# Patient Record
Sex: MALE | Race: WHITE | Employment: OTHER | URBAN - METROPOLITAN AREA
[De-identification: names, ages, dates, MRNs, and addresses within clinical notes are randomized per-mention and may not be internally consistent; named-entity substitution may affect disease eponyms.]

---

## 2018-01-13 PROBLEM — N40.0 BPH (BENIGN PROSTATIC HYPERPLASIA): Status: ACTIVE | Noted: 2018-01-13

## 2018-01-16 PROBLEM — N21.0 BLADDER STONES: Status: ACTIVE | Noted: 2018-01-16

## 2018-08-10 ENCOUNTER — HOSPITAL ENCOUNTER (OUTPATIENT)
Age: 78
Discharge: HOME | End: 2018-08-12
Payer: MEDICARE

## 2018-08-10 PROCEDURE — G0103 PSA SCREENING: HCPCS

## 2018-08-11 LAB — PROSTATE SPECIFIC ANTIGEN: 2.31 NG/ML (ref 0–4)

## 2018-10-12 ENCOUNTER — APPOINTMENT (OUTPATIENT)
Dept: CT IMAGING | Age: 78
End: 2018-10-12
Payer: MEDICARE

## 2018-10-12 ENCOUNTER — APPOINTMENT (OUTPATIENT)
Dept: MRI IMAGING | Age: 78
End: 2018-10-12
Payer: MEDICARE

## 2018-10-12 ENCOUNTER — APPOINTMENT (OUTPATIENT)
Dept: GENERAL RADIOLOGY | Age: 78
End: 2018-10-12
Payer: MEDICARE

## 2018-10-12 ENCOUNTER — HOSPITAL ENCOUNTER (OUTPATIENT)
Age: 78
Setting detail: OBSERVATION
Discharge: HOME | End: 2018-10-14
Attending: EMERGENCY MEDICINE | Admitting: FAMILY MEDICINE
Payer: MEDICARE

## 2018-10-12 ENCOUNTER — APPOINTMENT (OUTPATIENT)
Dept: ULTRASOUND IMAGING | Age: 78
End: 2018-10-12
Payer: MEDICARE

## 2018-10-12 DIAGNOSIS — R56.9 SEIZURES (HCC): ICD-10-CM

## 2018-10-12 DIAGNOSIS — R41.82 ALTERED MENTAL STATUS, UNSPECIFIED ALTERED MENTAL STATUS TYPE: Primary | ICD-10-CM

## 2018-10-12 DIAGNOSIS — N39.0 URINARY TRACT INFECTION WITHOUT HEMATURIA, SITE UNSPECIFIED: ICD-10-CM

## 2018-10-12 PROBLEM — G93.40 ACUTE ENCEPHALOPATHY: Status: ACTIVE | Noted: 2018-10-12

## 2018-10-12 PROBLEM — E03.9 HYPOTHYROIDISM: Status: ACTIVE | Noted: 2018-10-12

## 2018-10-12 PROBLEM — E78.5 HYPERLIPIDEMIA: Status: ACTIVE | Noted: 2018-10-12

## 2018-10-12 LAB
ALBUMIN SERPL-MCNC: 3.8 G/DL (ref 3.5–5.2)
ALP BLD-CCNC: 64 U/L (ref 40–129)
ALT SERPL-CCNC: 27 U/L (ref 0–40)
AMMONIA: 33 UMOL/L (ref 16–60)
ANION GAP SERPL CALCULATED.3IONS-SCNC: 10 MMOL/L (ref 7–16)
APTT: 27.8 SEC (ref 24.5–35.1)
AST SERPL-CCNC: 28 U/L (ref 0–39)
BACTERIA: ABNORMAL /HPF
BASOPHILS ABSOLUTE: 0.04 E9/L (ref 0–0.2)
BASOPHILS RELATIVE PERCENT: 0.6 % (ref 0–2)
BILIRUB SERPL-MCNC: 0.3 MG/DL (ref 0–1.2)
BILIRUBIN URINE: NEGATIVE
BLOOD, URINE: ABNORMAL
BUN BLDV-MCNC: 14 MG/DL (ref 8–23)
CALCIUM SERPL-MCNC: 9 MG/DL (ref 8.6–10.2)
CHLORIDE BLD-SCNC: 104 MMOL/L (ref 98–107)
CHP ED QC CHECK: YES
CLARITY: CLEAR
CO2: 24 MMOL/L (ref 22–29)
COLOR: YELLOW
CREAT SERPL-MCNC: 0.7 MG/DL (ref 0.7–1.2)
EOSINOPHILS ABSOLUTE: 0.21 E9/L (ref 0.05–0.5)
EOSINOPHILS RELATIVE PERCENT: 3.1 % (ref 0–6)
FILM ARRAY ADENOVIRUS: NORMAL
FILM ARRAY BORDETELLA PERTUSSIS: NORMAL
FILM ARRAY CHLAMYDOPHILIA PNEUMONIAE: NORMAL
FILM ARRAY CORONAVIRUS 229E: NORMAL
FILM ARRAY CORONAVIRUS HKU1: NORMAL
FILM ARRAY CORONAVIRUS NL63: NORMAL
FILM ARRAY CORONAVIRUS OC43: NORMAL
FILM ARRAY INFLUENZA A VIRUS 09H1: NORMAL
FILM ARRAY INFLUENZA A VIRUS H1: NORMAL
FILM ARRAY INFLUENZA A VIRUS H3: NORMAL
FILM ARRAY INFLUENZA A VIRUS: NORMAL
FILM ARRAY INFLUENZA B: NORMAL
FILM ARRAY METAPNEUMOVIRUS: NORMAL
FILM ARRAY MYCOPLASMA PNEUMONIAE: NORMAL
FILM ARRAY PARAINFLUENZA VIRUS 1: NORMAL
FILM ARRAY PARAINFLUENZA VIRUS 2: NORMAL
FILM ARRAY PARAINFLUENZA VIRUS 3: NORMAL
FILM ARRAY PARAINFLUENZA VIRUS 4: NORMAL
FILM ARRAY RESPIRATORY SYNCITIAL VIRUS: NORMAL
FILM ARRAY RHINOVIRUS/ENTEROVIRUS: NORMAL
GFR AFRICAN AMERICAN: >60
GFR NON-AFRICAN AMERICAN: >60 ML/MIN/1.73
GLUCOSE BLD-MCNC: 121 MG/DL
GLUCOSE BLD-MCNC: 121 MG/DL (ref 74–109)
GLUCOSE URINE: NEGATIVE MG/DL
HCT VFR BLD CALC: 44.1 % (ref 37–54)
HEMOGLOBIN: 15.2 G/DL (ref 12.5–16.5)
IMMATURE GRANULOCYTES #: 0.02 E9/L
IMMATURE GRANULOCYTES %: 0.3 % (ref 0–5)
INR BLD: 1
KETONES, URINE: NEGATIVE MG/DL
LACTIC ACID: 1.6 MMOL/L (ref 0.5–2.2)
LEUKOCYTE ESTERASE, URINE: NEGATIVE
LV EF: 52 %
LVEF MODALITY: NORMAL
LYMPHOCYTES ABSOLUTE: 2.83 E9/L (ref 1.5–4)
LYMPHOCYTES RELATIVE PERCENT: 41.4 % (ref 20–42)
MCH RBC QN AUTO: 32.5 PG (ref 26–35)
MCHC RBC AUTO-ENTMCNC: 34.5 % (ref 32–34.5)
MCV RBC AUTO: 94.4 FL (ref 80–99.9)
METER GLUCOSE: 121 MG/DL (ref 70–110)
MONOCYTES ABSOLUTE: 0.98 E9/L (ref 0.1–0.95)
MONOCYTES RELATIVE PERCENT: 14.3 % (ref 2–12)
NEUTROPHILS ABSOLUTE: 2.75 E9/L (ref 1.8–7.3)
NEUTROPHILS RELATIVE PERCENT: 40.3 % (ref 43–80)
NITRITE, URINE: POSITIVE
PDW BLD-RTO: 13 FL (ref 11.5–15)
PH UA: 7 (ref 5–9)
PLATELET # BLD: 166 E9/L (ref 130–450)
PMV BLD AUTO: 10 FL (ref 7–12)
POTASSIUM SERPL-SCNC: 4 MMOL/L (ref 3.5–5)
PROTEIN UA: NEGATIVE MG/DL
PROTHROMBIN TIME: 11.1 SEC (ref 9.3–12.4)
RBC # BLD: 4.67 E12/L (ref 3.8–5.8)
RBC UA: ABNORMAL /HPF (ref 0–2)
SODIUM BLD-SCNC: 138 MMOL/L (ref 132–146)
SPECIFIC GRAVITY UA: 1.02 (ref 1–1.03)
TOTAL PROTEIN: 7.5 G/DL (ref 6.4–8.3)
TROPONIN: <0.01 NG/ML (ref 0–0.03)
TSH SERPL DL<=0.05 MIU/L-ACNC: 2.68 UIU/ML (ref 0.27–4.2)
UROBILINOGEN, URINE: 0.2 E.U./DL
WBC # BLD: 6.8 E9/L (ref 4.5–11.5)
WBC UA: ABNORMAL /HPF (ref 0–5)

## 2018-10-12 PROCEDURE — G0378 HOSPITAL OBSERVATION PER HR: HCPCS

## 2018-10-12 PROCEDURE — 87186 SC STD MICRODIL/AGAR DIL: CPT

## 2018-10-12 PROCEDURE — 83605 ASSAY OF LACTIC ACID: CPT

## 2018-10-12 PROCEDURE — G8988 SELF CARE GOAL STATUS: HCPCS

## 2018-10-12 PROCEDURE — 82140 ASSAY OF AMMONIA: CPT

## 2018-10-12 PROCEDURE — 70551 MRI BRAIN STEM W/O DYE: CPT

## 2018-10-12 PROCEDURE — 97530 THERAPEUTIC ACTIVITIES: CPT

## 2018-10-12 PROCEDURE — 6370000000 HC RX 637 (ALT 250 FOR IP): Performed by: FAMILY MEDICINE

## 2018-10-12 PROCEDURE — 84443 ASSAY THYROID STIM HORMONE: CPT

## 2018-10-12 PROCEDURE — 84484 ASSAY OF TROPONIN QUANT: CPT

## 2018-10-12 PROCEDURE — 93005 ELECTROCARDIOGRAM TRACING: CPT | Performed by: EMERGENCY MEDICINE

## 2018-10-12 PROCEDURE — 97530 THERAPEUTIC ACTIVITIES: CPT | Performed by: PHYSICAL THERAPIST

## 2018-10-12 PROCEDURE — 87633 RESP VIRUS 12-25 TARGETS: CPT

## 2018-10-12 PROCEDURE — 81001 URINALYSIS AUTO W/SCOPE: CPT

## 2018-10-12 PROCEDURE — 2580000003 HC RX 258: Performed by: FAMILY MEDICINE

## 2018-10-12 PROCEDURE — 70450 CT HEAD/BRAIN W/O DYE: CPT

## 2018-10-12 PROCEDURE — G8979 MOBILITY GOAL STATUS: HCPCS | Performed by: PHYSICAL THERAPIST

## 2018-10-12 PROCEDURE — 96361 HYDRATE IV INFUSION ADD-ON: CPT

## 2018-10-12 PROCEDURE — 71045 X-RAY EXAM CHEST 1 VIEW: CPT

## 2018-10-12 PROCEDURE — 2580000003 HC RX 258: Performed by: EMERGENCY MEDICINE

## 2018-10-12 PROCEDURE — 93880 EXTRACRANIAL BILAT STUDY: CPT

## 2018-10-12 PROCEDURE — 96374 THER/PROPH/DIAG INJ IV PUSH: CPT

## 2018-10-12 PROCEDURE — 87486 CHLMYD PNEUM DNA AMP PROBE: CPT

## 2018-10-12 PROCEDURE — 93306 TTE W/DOPPLER COMPLETE: CPT

## 2018-10-12 PROCEDURE — G8987 SELF CARE CURRENT STATUS: HCPCS

## 2018-10-12 PROCEDURE — 97161 PT EVAL LOW COMPLEX 20 MIN: CPT | Performed by: PHYSICAL THERAPIST

## 2018-10-12 PROCEDURE — 87581 M.PNEUMON DNA AMP PROBE: CPT

## 2018-10-12 PROCEDURE — 6360000002 HC RX W HCPCS: Performed by: EMERGENCY MEDICINE

## 2018-10-12 PROCEDURE — 85610 PROTHROMBIN TIME: CPT

## 2018-10-12 PROCEDURE — 97165 OT EVAL LOW COMPLEX 30 MIN: CPT

## 2018-10-12 PROCEDURE — 82962 GLUCOSE BLOOD TEST: CPT

## 2018-10-12 PROCEDURE — 99285 EMERGENCY DEPT VISIT HI MDM: CPT

## 2018-10-12 PROCEDURE — 87088 URINE BACTERIA CULTURE: CPT

## 2018-10-12 PROCEDURE — G8978 MOBILITY CURRENT STATUS: HCPCS | Performed by: PHYSICAL THERAPIST

## 2018-10-12 PROCEDURE — 85730 THROMBOPLASTIN TIME PARTIAL: CPT

## 2018-10-12 PROCEDURE — 85025 COMPLETE CBC W/AUTO DIFF WBC: CPT

## 2018-10-12 PROCEDURE — 87798 DETECT AGENT NOS DNA AMP: CPT

## 2018-10-12 PROCEDURE — 36415 COLL VENOUS BLD VENIPUNCTURE: CPT

## 2018-10-12 PROCEDURE — 94761 N-INVAS EAR/PLS OXIMETRY MLT: CPT

## 2018-10-12 PROCEDURE — 87040 BLOOD CULTURE FOR BACTERIA: CPT

## 2018-10-12 PROCEDURE — 80053 COMPREHEN METABOLIC PANEL: CPT

## 2018-10-12 RX ORDER — PHENYTOIN SODIUM 100 MG/1
200 CAPSULE, EXTENDED RELEASE ORAL 2 TIMES DAILY
Status: DISCONTINUED | OUTPATIENT
Start: 2018-10-12 | End: 2018-10-14 | Stop reason: HOSPADM

## 2018-10-12 RX ORDER — ATORVASTATIN CALCIUM 20 MG/1
20 TABLET, FILM COATED ORAL DAILY
Status: DISCONTINUED | OUTPATIENT
Start: 2018-10-12 | End: 2018-10-14 | Stop reason: HOSPADM

## 2018-10-12 RX ORDER — TAMSULOSIN HYDROCHLORIDE 0.4 MG/1
0.4 CAPSULE ORAL DAILY
Status: DISCONTINUED | OUTPATIENT
Start: 2018-10-12 | End: 2018-10-12

## 2018-10-12 RX ORDER — SODIUM CHLORIDE 9 MG/ML
INJECTION, SOLUTION INTRAVENOUS CONTINUOUS
Status: DISCONTINUED | OUTPATIENT
Start: 2018-10-12 | End: 2018-10-13

## 2018-10-12 RX ORDER — ASPIRIN 325 MG
325 TABLET ORAL DAILY
Status: DISCONTINUED | OUTPATIENT
Start: 2018-10-12 | End: 2018-10-14 | Stop reason: HOSPADM

## 2018-10-12 RX ORDER — SODIUM CHLORIDE 9 MG/ML
1000 INJECTION, SOLUTION INTRAVENOUS ONCE
Status: COMPLETED | OUTPATIENT
Start: 2018-10-12 | End: 2018-10-12

## 2018-10-12 RX ORDER — CHLORAL HYDRATE 500 MG
1000 CAPSULE ORAL 2 TIMES DAILY
Status: DISCONTINUED | OUTPATIENT
Start: 2018-10-12 | End: 2018-10-12 | Stop reason: CLARIF

## 2018-10-12 RX ORDER — M-VIT,TX,IRON,MINS/CALC/FOLIC 27MG-0.4MG
1 TABLET ORAL DAILY
Status: DISCONTINUED | OUTPATIENT
Start: 2018-10-12 | End: 2018-10-14 | Stop reason: HOSPADM

## 2018-10-12 RX ORDER — LEVOTHYROXINE SODIUM 0.1 MG/1
100 TABLET ORAL DAILY
Status: DISCONTINUED | OUTPATIENT
Start: 2018-10-12 | End: 2018-10-14 | Stop reason: HOSPADM

## 2018-10-12 RX ADMIN — SODIUM CHLORIDE 1000 ML: 9 INJECTION, SOLUTION INTRAVENOUS at 04:56

## 2018-10-12 RX ADMIN — PHENYTOIN SODIUM 200 MG: 100 CAPSULE ORAL at 13:10

## 2018-10-12 RX ADMIN — ATORVASTATIN CALCIUM 20 MG: 20 TABLET, FILM COATED ORAL at 13:10

## 2018-10-12 RX ADMIN — SODIUM CHLORIDE: 9 INJECTION, SOLUTION INTRAVENOUS at 22:58

## 2018-10-12 RX ADMIN — CEFTRIAXONE 2 G: 2 INJECTION, POWDER, FOR SOLUTION INTRAMUSCULAR; INTRAVENOUS at 06:58

## 2018-10-12 RX ADMIN — SODIUM CHLORIDE: 9 INJECTION, SOLUTION INTRAVENOUS at 13:11

## 2018-10-12 RX ADMIN — ASPIRIN 325 MG ORAL TABLET 325 MG: 325 PILL ORAL at 13:11

## 2018-10-12 RX ADMIN — LEVOTHYROXINE SODIUM 100 MCG: 100 TABLET ORAL at 13:11

## 2018-10-12 RX ADMIN — MULTIPLE VITAMINS W/ MINERALS TAB 1 TABLET: TAB at 13:11

## 2018-10-12 RX ADMIN — PHENYTOIN SODIUM 200 MG: 100 CAPSULE ORAL at 20:04

## 2018-10-12 ASSESSMENT — ENCOUNTER SYMPTOMS
EYE PAIN: 0
SINUS PRESSURE: 0
SORE THROAT: 0
VOMITING: 0
SHORTNESS OF BREATH: 0
BACK PAIN: 0
NAUSEA: 0
ABDOMINAL PAIN: 0
COUGH: 0
EYE REDNESS: 0
EYE DISCHARGE: 0
WHEEZING: 0
DIARRHEA: 0

## 2018-10-12 ASSESSMENT — PAIN SCALES - GENERAL
PAINLEVEL_OUTOF10: 0

## 2018-10-12 NOTE — H&P
Name: David Barrera  :   MRN: [id number]  Room:   DOS: 10/12/2018    Family Medicine Resident   History & Physical    PCP: Jacqueline Conrad DO  Admitting Physician: No admitting provider for patient encounter. Consultants: Dr. Liza Caballero (neurology)    Chief Complaint:  Acute encephalopathy since resolved, UTI    Subjective     History of Present Illness  Casper Allison is a 66y.o. year old male who  has a past medical history of BPH (benign prostatic hyperplasia); Hearing disorder; Hyperlipidemia; Hypothyroidism; Kidney stone; Prolonged emergence from general anesthesia; Seizures ([address] Thyroid disease; and UTI (urinary tract infection). .     He was admitted to ED for acute encephalopathy. EMS found pt unresponsive and not breathing at home. EMS reported right sides facial droop and right sided weakness. The encephalopathy has since resolved, facial droop, and right sided weakness has since resolved. Acute encephalopathy   · Currently the pt is alert and oriented. · Pt claimed while being unresponsive he felt being diaphoretic, cold, and unable to respond despite maintaining sense of hearing and touch. · He has history of seizures controlled with phenytoin but forgot when the last episode was, but claims wife would have documented the episodes. · Pt currently has right eye ptosis. As well as past history of hard of hearing in left ear. ·     UTI  · Pt denies fevers, chills, has no urinary symptoms  · Patient has history of \"kidney stone surgery\" in 1970s as well as a TURP in 2018      Emergency Department Course  On presentation to the emergency department, the patient had laboratory work as well as imaging studies performed as shown below. · CT head w/o contrast on 10/12: \"No acute findings. \"  · CXR on 10/12: \"Negative portable chest.\"  · Bacteria, nitrites were found on UA  · Temp 97.8F,      Vitals in ED  ED TRIAGE VITALS  BP: (!) 149/85, Temp: 97.8 °F (36.6 °C), Pulse: 59, Resp: 17, SpO2: 95

## 2018-10-12 NOTE — PLAN OF CARE
Pharmacy Note    Nic Orozco was ordered fish oil. As per the [address], herbals and certain dietary supplements will be discontinued. The herbal or dietary supplement may be continued after discharge from the hospital.    Arabella Campbell, PharmD.  Ph.D.

## 2018-10-13 LAB
ALBUMIN SERPL-MCNC: 3.3 G/DL (ref 3.5–5.2)
ALP BLD-CCNC: 57 U/L (ref 40–129)
ALT SERPL-CCNC: 26 U/L (ref 0–40)
ANION GAP SERPL CALCULATED.3IONS-SCNC: 10 MMOL/L (ref 7–16)
AST SERPL-CCNC: 25 U/L (ref 0–39)
BASOPHILS ABSOLUTE: 0.03 E9/L (ref 0–0.2)
BASOPHILS RELATIVE PERCENT: 0.4 % (ref 0–2)
BILIRUB SERPL-MCNC: 0.3 MG/DL (ref 0–1.2)
BUN BLDV-MCNC: 14 MG/DL (ref 8–23)
CALCIUM SERPL-MCNC: 8.8 MG/DL (ref 8.6–10.2)
CHLORIDE BLD-SCNC: 102 MMOL/L (ref 98–107)
CHOLESTEROL, TOTAL: 171 MG/DL (ref 0–199)
CO2: 26 MMOL/L (ref 22–29)
CREAT SERPL-MCNC: 0.8 MG/DL (ref 0.7–1.2)
EOSINOPHILS ABSOLUTE: 0.17 E9/L (ref 0.05–0.5)
EOSINOPHILS RELATIVE PERCENT: 2.1 % (ref 0–6)
GFR AFRICAN AMERICAN: >60
GFR NON-AFRICAN AMERICAN: >60 ML/MIN/1.73
GLUCOSE BLD-MCNC: 114 MG/DL (ref 74–109)
HBA1C MFR BLD: 5.6 % (ref 4–5.6)
HCT VFR BLD CALC: 41 % (ref 37–54)
HDLC SERPL-MCNC: 43 MG/DL
HEMOGLOBIN: 14 G/DL (ref 12.5–16.5)
IMMATURE GRANULOCYTES #: 0.02 E9/L
IMMATURE GRANULOCYTES %: 0.2 % (ref 0–5)
LDL CHOLESTEROL CALCULATED: 97 MG/DL (ref 0–99)
LYMPHOCYTES ABSOLUTE: 2.8 E9/L (ref 1.5–4)
LYMPHOCYTES RELATIVE PERCENT: 33.9 % (ref 20–42)
MCH RBC QN AUTO: 32.6 PG (ref 26–35)
MCHC RBC AUTO-ENTMCNC: 34.1 % (ref 32–34.5)
MCV RBC AUTO: 95.3 FL (ref 80–99.9)
MONOCYTES ABSOLUTE: 0.79 E9/L (ref 0.1–0.95)
MONOCYTES RELATIVE PERCENT: 9.6 % (ref 2–12)
NEUTROPHILS ABSOLUTE: 4.46 E9/L (ref 1.8–7.3)
NEUTROPHILS RELATIVE PERCENT: 53.8 % (ref 43–80)
PDW BLD-RTO: 13.1 FL (ref 11.5–15)
PHENYTOIN DOSE AMOUNT: NORMAL
PHENYTOIN LEVEL: 10 UG/ML (ref 10–20)
PLATELET # BLD: 174 E9/L (ref 130–450)
PMV BLD AUTO: 10.4 FL (ref 7–12)
POTASSIUM SERPL-SCNC: 4.2 MMOL/L (ref 3.5–5)
RBC # BLD: 4.3 E12/L (ref 3.8–5.8)
SODIUM BLD-SCNC: 138 MMOL/L (ref 132–146)
TOTAL PROTEIN: 6.5 G/DL (ref 6.4–8.3)
TRIGL SERPL-MCNC: 154 MG/DL (ref 0–149)
VLDLC SERPL CALC-MCNC: 31 MG/DL
WBC # BLD: 8.3 E9/L (ref 4.5–11.5)

## 2018-10-13 PROCEDURE — 6360000002 HC RX W HCPCS: Performed by: FAMILY MEDICINE

## 2018-10-13 PROCEDURE — 80053 COMPREHEN METABOLIC PANEL: CPT

## 2018-10-13 PROCEDURE — 6370000000 HC RX 637 (ALT 250 FOR IP): Performed by: FAMILY MEDICINE

## 2018-10-13 PROCEDURE — 83036 HEMOGLOBIN GLYCOSYLATED A1C: CPT

## 2018-10-13 PROCEDURE — G0378 HOSPITAL OBSERVATION PER HR: HCPCS

## 2018-10-13 PROCEDURE — 2580000003 HC RX 258: Performed by: FAMILY MEDICINE

## 2018-10-13 PROCEDURE — 85025 COMPLETE CBC W/AUTO DIFF WBC: CPT

## 2018-10-13 PROCEDURE — 80061 LIPID PANEL: CPT

## 2018-10-13 PROCEDURE — 80185 ASSAY OF PHENYTOIN TOTAL: CPT

## 2018-10-13 PROCEDURE — 36415 COLL VENOUS BLD VENIPUNCTURE: CPT

## 2018-10-13 PROCEDURE — 96376 TX/PRO/DX INJ SAME DRUG ADON: CPT

## 2018-10-13 RX ORDER — ACETAMINOPHEN 325 MG/1
650 TABLET ORAL EVERY 4 HOURS PRN
Status: DISCONTINUED | OUTPATIENT
Start: 2018-10-13 | End: 2018-10-14 | Stop reason: HOSPADM

## 2018-10-13 RX ORDER — CLONIDINE HYDROCHLORIDE 0.2 MG/1
0.2 TABLET ORAL ONCE
Status: COMPLETED | OUTPATIENT
Start: 2018-10-13 | End: 2018-10-13

## 2018-10-13 RX ADMIN — ATORVASTATIN CALCIUM 20 MG: 20 TABLET, FILM COATED ORAL at 08:42

## 2018-10-13 RX ADMIN — PHENYTOIN SODIUM 200 MG: 100 CAPSULE ORAL at 08:42

## 2018-10-13 RX ADMIN — ASPIRIN 325 MG ORAL TABLET 325 MG: 325 PILL ORAL at 08:42

## 2018-10-13 RX ADMIN — WATER 1 G: 1 INJECTION, SOLUTION INTRAVENOUS at 06:30

## 2018-10-13 RX ADMIN — CLONIDINE HYDROCHLORIDE 0.2 MG: 0.2 TABLET ORAL at 09:03

## 2018-10-13 RX ADMIN — ACETAMINOPHEN 650 MG: 325 TABLET, FILM COATED ORAL at 23:46

## 2018-10-13 RX ADMIN — LEVOTHYROXINE SODIUM 100 MCG: 100 TABLET ORAL at 05:58

## 2018-10-13 RX ADMIN — MULTIPLE VITAMINS W/ MINERALS TAB 1 TABLET: TAB at 08:42

## 2018-10-13 RX ADMIN — PHENYTOIN SODIUM 200 MG: 100 CAPSULE ORAL at 23:46

## 2018-10-13 ASSESSMENT — PAIN DESCRIPTION - DESCRIPTORS: DESCRIPTORS: ACHING;CONSTANT

## 2018-10-13 ASSESSMENT — PAIN SCALES - GENERAL
PAINLEVEL_OUTOF10: 3
PAINLEVEL_OUTOF10: 0

## 2018-10-13 ASSESSMENT — PAIN DESCRIPTION - ONSET: ONSET: GRADUAL

## 2018-10-13 ASSESSMENT — PAIN DESCRIPTION - PAIN TYPE: TYPE: ACUTE PAIN

## 2018-10-13 ASSESSMENT — PAIN DESCRIPTION - FREQUENCY: FREQUENCY: INTERMITTENT

## 2018-10-13 ASSESSMENT — PAIN DESCRIPTION - LOCATION: LOCATION: HEAD

## 2018-10-13 ASSESSMENT — PAIN DESCRIPTION - PROGRESSION: CLINICAL_PROGRESSION: NOT CHANGED

## 2018-10-13 ASSESSMENT — PAIN DESCRIPTION - ORIENTATION: ORIENTATION: LEFT;DISTAL

## 2018-10-13 NOTE — PLAN OF CARE
Problem: Falls - Risk of:  Goal: Will remain free from falls  Will remain free from falls   Outcome: Ongoing      Problem: Mental Status - Impaired:  Goal: Mental status will improve  Mental status will improve   Outcome: Ongoing

## 2018-10-14 VITALS
DIASTOLIC BLOOD PRESSURE: 68 MMHG | SYSTOLIC BLOOD PRESSURE: 113 MMHG | WEIGHT: 189.6 LBS | HEART RATE: 74 BPM | OXYGEN SATURATION: 94 % | TEMPERATURE: 97.8 F | BODY MASS INDEX: 31.59 KG/M2 | RESPIRATION RATE: 16 BRPM | HEIGHT: 65 IN

## 2018-10-14 PROBLEM — G93.40 ACUTE ENCEPHALOPATHY: Status: RESOLVED | Noted: 2018-10-12 | Resolved: 2018-10-14

## 2018-10-14 LAB
ALBUMIN SERPL-MCNC: 3.5 G/DL (ref 3.5–5.2)
ALP BLD-CCNC: 61 U/L (ref 40–129)
ALT SERPL-CCNC: 28 U/L (ref 0–40)
ANION GAP SERPL CALCULATED.3IONS-SCNC: 9 MMOL/L (ref 7–16)
AST SERPL-CCNC: 27 U/L (ref 0–39)
BILIRUB SERPL-MCNC: 0.4 MG/DL (ref 0–1.2)
BUN BLDV-MCNC: 18 MG/DL (ref 8–23)
CALCIUM SERPL-MCNC: 8.7 MG/DL (ref 8.6–10.2)
CHLORIDE BLD-SCNC: 104 MMOL/L (ref 98–107)
CO2: 24 MMOL/L (ref 22–29)
CREAT SERPL-MCNC: 0.7 MG/DL (ref 0.7–1.2)
GFR AFRICAN AMERICAN: >60
GFR NON-AFRICAN AMERICAN: >60 ML/MIN/1.73
GLUCOSE BLD-MCNC: 121 MG/DL (ref 74–109)
HCT VFR BLD CALC: 43.8 % (ref 37–54)
HEMOGLOBIN: 14.6 G/DL (ref 12.5–16.5)
MCH RBC QN AUTO: 31.7 PG (ref 26–35)
MCHC RBC AUTO-ENTMCNC: 33.3 % (ref 32–34.5)
MCV RBC AUTO: 95.2 FL (ref 80–99.9)
ORGANISM: ABNORMAL
PDW BLD-RTO: 12.7 FL (ref 11.5–15)
PLATELET # BLD: 207 E9/L (ref 130–450)
PMV BLD AUTO: 10.4 FL (ref 7–12)
POTASSIUM SERPL-SCNC: 4.4 MMOL/L (ref 3.5–5)
RBC # BLD: 4.6 E12/L (ref 3.8–5.8)
SODIUM BLD-SCNC: 137 MMOL/L (ref 132–146)
TOTAL PROTEIN: 6.8 G/DL (ref 6.4–8.3)
URINE CULTURE, ROUTINE: ABNORMAL
URINE CULTURE, ROUTINE: ABNORMAL
WBC # BLD: 10.4 E9/L (ref 4.5–11.5)

## 2018-10-14 PROCEDURE — 96376 TX/PRO/DX INJ SAME DRUG ADON: CPT

## 2018-10-14 PROCEDURE — 6360000002 HC RX W HCPCS: Performed by: FAMILY MEDICINE

## 2018-10-14 PROCEDURE — 36415 COLL VENOUS BLD VENIPUNCTURE: CPT

## 2018-10-14 PROCEDURE — G0378 HOSPITAL OBSERVATION PER HR: HCPCS

## 2018-10-14 PROCEDURE — 85027 COMPLETE CBC AUTOMATED: CPT

## 2018-10-14 PROCEDURE — 2580000003 HC RX 258: Performed by: FAMILY MEDICINE

## 2018-10-14 PROCEDURE — 6370000000 HC RX 637 (ALT 250 FOR IP): Performed by: FAMILY MEDICINE

## 2018-10-14 PROCEDURE — 80053 COMPREHEN METABOLIC PANEL: CPT

## 2018-10-14 RX ORDER — CEPHALEXIN 500 MG/1
500 CAPSULE ORAL 2 TIMES DAILY
Qty: 14 CAPSULE | Refills: 0 | Status: SHIPPED | OUTPATIENT
Start: 2018-10-14 | End: 2018-10-21

## 2018-10-14 RX ADMIN — ASPIRIN 325 MG ORAL TABLET 325 MG: 325 PILL ORAL at 09:03

## 2018-10-14 RX ADMIN — ATORVASTATIN CALCIUM 20 MG: 20 TABLET, FILM COATED ORAL at 09:03

## 2018-10-14 RX ADMIN — PHENYTOIN SODIUM 200 MG: 100 CAPSULE ORAL at 09:03

## 2018-10-14 RX ADMIN — WATER 1 G: 1 INJECTION, SOLUTION INTRAVENOUS at 06:43

## 2018-10-14 RX ADMIN — LEVOTHYROXINE SODIUM 100 MCG: 100 TABLET ORAL at 06:44

## 2018-10-14 RX ADMIN — MULTIPLE VITAMINS W/ MINERALS TAB 1 TABLET: TAB at 09:13

## 2018-10-14 ASSESSMENT — PAIN SCALES - GENERAL
PAINLEVEL_OUTOF10: 0

## 2018-10-14 ASSESSMENT — PAIN DESCRIPTION - PROGRESSION: CLINICAL_PROGRESSION: NOT CHANGED

## 2018-10-14 NOTE — PROGRESS NOTES
Department of Family Medicine  Progress Note  Name: Samuel Valadez  :  1940, [de-identified]66 y.o., male)   MR#:  [id number]  Admission Date:  10/12/2018  4:16 AM  Date of Service:  10/13/2018  Allergies:  Patient has no known allergies. Hospital Day: 3  Subjective:   Samuel Valadez is a 66 y.o. male. Past medical history includes BPH, hyperlipidemia, deafness of the left ear and ptosis of the right eye, seizure disorder. Patient had progressive weakness while saying about 2 weeks prior to an episode of altered mental status. Patient's family states that he was incoherent and patient has no memory of the episode. He was reportedly diaphoretic and had reported right-sided muscle weakness which was resolved upon reaching the emergency department. Patient does have a history of seizures but has not happened some time. He reports compliance with his medications. Following admission, the urinary tract infection was treated with Rocephin until urine culture grew E. coli with sensitivities pending. Patient will be switched to oral medications. Other workup was unremarkable and included MRI of the brain, carotid artery ultrasound, echocardiogram. Patient did have an episode of hypertension this morning and was given a dose of 0.2 mg clonidine once. Since currently, his blood pressure improved. Past 24 hours:  · Pt is tolerating diet well  · Pt is having bowel movements  · BP has remained stable. Review of Systems: All bolded are positive, all others are negative. General:  Fever, chills, diaphoresis, fatigue, malaise, night sweats, weight loss  Psychological:  Anxiety, disorientation, hallucinations. ENT:  Epistaxis, headaches, vertigo, visual changes. Cardiovascular:  Chest pain, irregular heartbeats, palpitations, paroxysmal nocturnal dyspnea. Respiratory:  Shortness of breath, coughing, sputum production, hemoptysis, wheezing, orthopnea.   Gastrointestinal:  Nausea, vomiting, diarrhea, heartburn, constipation,
Patient is agreeable to evaluation. Precautions: falls  ,   Social history: [place] to [place]      [place]  Equipment owned: bedside commode and straight cane,  wheeled walker  Mentation: alert, cooperative, oriented x 3 and follows directions,    Prior level of function: indep with cane  ROM: within functional limits   STRENGTH: 4/5  PAIN: (measured on a visual analog scale with 0=no pain and 10=excruciating pain) 0/10. FUNCTIONAL ASSESSMENT    Bed Mobility- Supine to sit-Minimal assists       Scooting- Supervision     Sit to supine-  not assessed the patient is up in the chair; nursing aware    Patient able to dangle on edge of bed for 5 minutes with no assist   Transfers-sit to stand- supervision  From commode and chair   Gait:  Patient ambulated 75 feet using  quad cane  With Supervision      Balance: sit-good         stand fair       Edema: no  Endurance: fair       Treatment:evaluation;  gait  Patient/family education:effects of immobilty      Rehab Potential: good  for baseline  Patients Goal: None stated   ASSESSMENT  Patient exhibits decreased   strength, endurance,  balance, coordination impairing functional mobility. Goals to be met in 3 days. Bed mobility-Independent  Transfers-Independent  Ambulation-Independent for 100 feet using  cane  Stairs-up and down 10  step(s) using 1 rail(s) with Supervision   Comments:       Increase strength in affected muscle groups by 1/3 grade  Increase balance to allow for improvement towards functional goals. Increase endurance to allow for improvement towards functional goals.
specific conditions for emergent return, as well as the importance of follow-up. Their questions are answered at this time and they are agreeable with the plan for discharge to home    DISCHARGE MEDICATIONS:    Hose, [address] Medication Instructions RGS:[id number]    Printed on:10/14/18 0860   Medication Information                      aspirin 325 MG tablet  Take 325 mg by mouth daily. atorvastatin (LIPITOR) 20 MG tablet  Take 20 mg by mouth daily. cephALEXin (KEFLEX) 500 MG capsule  Take 1 capsule by mouth 2 times daily for 7 days             levothyroxine (SYNTHROID) 100 MCG tablet  Take 100 mcg by mouth daily. Multiple Vitamins-Minerals (THERAPEUTIC MULTIVITAMIN-MINERALS) tablet  Take 1 tablet by mouth daily. Omega-3 Fatty Acids (FISH OIL) 1000 MG CAPS  Take 1,000 mg by mouth 2 times daily. phenytoin (DILANTIN) 100 MG ER capsule  Take 200 mg by mouth 2 times daily. FOLLOW UP/INSTRUCTIONS:  · This patient is instructed to follow-up with his primary care physician. · Patient is instructed to follow-up with the consults listed above as directed by them. · he is instructed to resume home medications and take new medications as indicated in the list above. · If the patient has a recurrence of symptoms, he is instructed to go to the ED. Preparing for this patient's discharge, including paperwork, orders, instructions, and meeting with patient did require > 30 minutes. The patient and plan were discussed with my attending physician, Nanda Santos.     Naty Li DO PGY-2 [facility]    10/14/2018  2:45 PM

## 2018-10-15 LAB
EKG ATRIAL RATE: 61 BPM
EKG P AXIS: 42 DEGREES
EKG P-R INTERVAL: 148 MS
EKG Q-T INTERVAL: 422 MS
EKG QRS DURATION: 78 MS
EKG QTC CALCULATION (BAZETT): 424 MS
EKG R AXIS: -36 DEGREES
EKG T AXIS: 5 DEGREES
EKG VENTRICULAR RATE: 61 BPM

## 2018-10-15 NOTE — DISCHARGE SUMMARY
5.6 10/13/2018      IMAGING:  Ct Head Wo Contrast     Result Date: 10/12/2018  LOCATION: 200 EXAM: CT HEAD WO CONTRAST COMPARISON: None HISTORY: Confusion TECHNIQUE: Noncontrast helical CT images were performed of the head. Coronal and sagittal reconstructions also obtained. Automated dose control was used for this exam. CONTRAST: No intravenous contrast administered. FINDINGS: HEAD: There is mild diffuse atrophy. A few small areas of decreased density are seen in the periventricular and subcortical white matter, likely chronic microvascular ischemic related. There is no evidence of intracranial hemorrhage or mass. No extra-axial fluid is seen. The paranasal sinuses are clear. The globes and orbits are normal.  No abnormalities of the calvarium are identified.      1. No acute findings.     Xr Chest Portable     Result Date: 10/12/2018  LOCATION: 200 EXAM: XR CHEST PORTABLE COMPARISON: None HISTORY: Shortness of breath TECHNIQUE: Single frontal view of the chest was obtained. FINDINGS:  SUPPORT DEVICES: None. LUNGS: Clear with no areas of consolidation. PLEURA: No effusions or pneumothorax. LUNG VOLUMES: Satisfactory inspirator effort. MEDIASTINAL STRUCTURES: No lymphadenopathy. Normal aortic contour. HEART SIZE: Normal. BONES AND SOFT TISSUES: No fracture or soft tissue abnormality.      1. Negative portable chest.       Mri Brain Wo Contrast     Result Date: 10/12/2018  Reading location: 100 DATE OF SERVICE: 10/12/2018 8:45 AM EXAM: MRI of the brain without intravenous contrast CLINICAL HISTORY: Acute encephalopathy, decreased responsiveness, right facial droop and right-sided weakness.  TECHNIQUE: T1-weighted sagittal images and diffusion-weighted, T1-weighted, T2-weighted, and FLAIR axial images were obtained through the brain without the use of intravenous contrast. COMPARISON: CT scan of the brain dated 10/12/2018 at 4:34 AM. FINDINGS: There is no noncontrast MR evidence for acute/subacute ischemia, intracranial hemorrhage, mass lesion, or abnormal extra-axial fluid collection. Mild to moderate global volume loss is present. There are small chronic lacunar infarctions involving the left centrum semiovale. There are small foci of encephalomalacia involving the cerebellar hemispheres bilaterally. There are minimal periventricular deep cerebral white matter hyperintensities on the T2 weighted and FLAIR images consistent with chronic changes of small vessel ischemic disease. The ventricular system is unremarkable in configuration. There is no midline shift or other herniation. Major vascular flow voids are unremarkable in appearance. 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 The paranasal sinuses and mastoid regions are unremarkable in appearance as visualized.      No noncontrast MR evidence for acute intracranial pathology.     Us Carotid Artery Bilateral     Result Date: 10/12/2018  Location:200 Exam: US CAROTID ARTERY BILATERAL Comparison: Previous MRA of the neck of 7/1/2006 History: Altered mental status Carotid Duplex Criteria:  Multiple Realtime, grayscale and color Doppler flow ultrasound images with spectral waveform analysis of the carotid bifurcations were obtained. Measurement of carotid stenosis is based on peak systolic and diastolic velocity parameters that correlate the residual internal carotid diameter with [place] [employer] Symptomatic Carotid Endarterectomy Trial (NASCET)-based stenosis levels. Findings: On the right, there is no plaque within the carotid bulb or internal carotid artery. The peak ICA velocity is . 88 cm/s  The ICA/CCA ratio is  1.1. There is  no spectral broadening. The external carotid artery is patent. The vertebral artery has antegrade flow. On the left,  there is  no plaque within left carotid bulb or left internal coronary. The peak ICA velocity is 96 cm/s. The ICA/CCA ratio is  0.92.  There is  no spectral broadening. The external carotid artery is patent. The vertebral artery has antegrade flow.      1. There is no measurable stenosis of the carotid arteries. 2. Antegrade flow within the vertebral arteries. .     HOSPITAL COURSE:   Following admission, the urinary tract infection was treated with Rocephin until urine culture grew E Coli with sensitivities below. Pt had zapata catheter placed on admission and was able to urinate following removal as well as ambulate without assistance the day of discharge. Patient was switched to oral medications to be taken at home following discharge. Other workup was unremarkable and included MRI of the brain, carotid artery ultrasound, echocardiogram.     BRIEF PHYSICAL EXAMINATION AND LABORATORIES ON DAY OF DISCHARGE:  VITALS:  /68   Pulse 74   Temp 97.8 °F (36.6 °C) (Oral)   Resp 16   Ht 5' 5\" (1.651 m)   Wt 189 lb 9.6 oz (86 kg)   SpO2 94%   BMI 31.55 kg/m²      HEENT:  Right eye ptosis. EOMI. Sclera clear. Buccal mucosa moist.     Neck:  Supple. Trachea midline. No thyromegaly. No JVD. No bruits.     Heart:  Rhythm regular, rate controlled. No murmurs.     Lungs:  Symmetrical. Clear to auscultation bilaterally. No wheezes. No rhonchi. No rales.     Abdomen: Soft. Non-tender. Non-distended. Bowel sounds positive. No organomegaly or masses. No pain on palpation     Extremities:  Peripheral pulses present. No peripheral edema. No ulcers.     Neurologic:  Alert x 3. No focal deficit. Cranial nerves grossly intact.     Skin:  No petechia. No hemorrhage. No wounds.     DISPOSITION:  The patient's condition is good. At this time the patient is without objective evidence of an acute process requiring continuing hospitalization or inpatient management.   They are stable for discharge with outpatient follow-up.     I have spoken with the patient and spouse and discussed the results of the current hospitalization, in addition to providing specific details for the

## 2018-10-17 LAB
BLOOD CULTURE, ROUTINE: NORMAL
CULTURE, BLOOD 2: NORMAL

## 2018-11-11 PROBLEM — N39.0 UTI (URINARY TRACT INFECTION): Status: RESOLVED | Noted: 2018-10-12 | Resolved: 2018-11-11

## 2020-11-04 ENCOUNTER — HOSPITAL ENCOUNTER (OUTPATIENT)
Age: 80
Discharge: HOME | End: 2020-11-06
Payer: MEDICARE

## 2020-11-04 PROCEDURE — U0003 INFECTIOUS AGENT DETECTION BY NUCLEIC ACID (DNA OR RNA); SEVERE ACUTE RESPIRATORY SYNDROME CORONAVIRUS 2 (SARS-COV-2) (CORONAVIRUS DISEASE [COVID-19]), AMPLIFIED PROBE TECHNIQUE, MAKING USE OF HIGH THROUGHPUT TECHNOLOGIES AS DESCRIBED BY CMS-2020-01-R: HCPCS

## 2020-11-06 LAB
SARS-COV-2: NOT DETECTED
SOURCE: NORMAL

## 2020-11-06 RX ORDER — ATORVASTATIN CALCIUM 20 MG/1
20 TABLET, FILM COATED ORAL DAILY
COMMUNITY
End: 2021-10-14 | Stop reason: SDUPTHER

## 2020-11-06 NOTE — PROGRESS NOTES
[address]        Date: 11/6/2020    Date of surgery:  11/9/2020      Arrival Time: Hospital will call you between 5pm and 7pm with your final arrival time for surgery    1. Do not eat or drink anything after     Midnight     prior to surgery. This includes no water, chewing gum, mints or ice chips. 2. Take the following medications with a small sip of water on the morning of Surgery:  Take dilantin and thyroid dos with sip water    3. Diabetics may take evening dose of insulin but none after midnight. If you feel symptomatic or low blood sugar morning of surgery drink 1-2 ounces of apple juice only. 4. Aspirin, Ibuprofen, Advil, Naproxen, Vitamin E and other Anti-inflammatory products should be stopped  before surgery  as directed by your physician. Take Tylenol only unless instructed otherwise by your surgeon. 5. Check with your Doctor regarding stopping Plavix, Coumadin, Lovenox, Eliquis, Effient, or other blood thinners. 6. Do not smoke,use illicit drugs and do not drink any alcoholic beverages 24 hours prior to surgery. 7. You may brush your teeth the morning of surgery. DO NOT SWALLOW WATER    8. You MUST make arrangements for a responsible adult to take you home after your surgery. You will not be allowed to leave alone or drive yourself home. It is strongly suggested someone stay with you the first 24 hrs. Your surgery will be cancelled if you do not have a ride home. 9. PEDIATRIC PATIENTS ONLY:  A parent/legal guardian must accompany a child scheduled for surgery and plan to stay at the hospital until the child is discharged. Please do not bring other children with you.     10. Please wear simple, loose fitting clothing to the hospital.  Do not bring valuables (money, credit cards, checkbooks, etc.) Do not wear any makeup (including no eye makeup) or nail polish on your fingers or toes. 11. DO NOT wear any jewelry or piercings on day of surgery. All body piercing jewelry must be removed. 12. Shower the night before surgery with __x_Antibacterial soap /MARICRUZ WIPES________    13. TOTAL JOINT REPLACEMENT/HYSTERECTOMY PATIENTS ONLY---Remember to bring Blood Bank bracelet to the hospital on the day of surgery. 14. If you have a Living Will and Durable Power of  for Healthcare, please bring in a copy. 15. If appropriate bring crutches, inspirex, WALKER, CANE etc... 12. Notify your Surgeon if you develop any illness between now and surgery time, cough, cold, fever, sore throat, nausea, vomiting, etc.  Please notify your surgeon if you experience dizziness, shortness of breath or blurred vision between now & the time of your surgery. 17. If you have ___dentures, they will be removed before going to the OR; we will provide you a container. If you wear ___contact lenses or ___glasses, they will be removed; please bring a case for them. 18. To provide excellent care visitors will be limited to 2 in the room at any given time. 19. Please bring picture ID and insurance card. 20. Sleep apnea patients need to bring CPAP AND SETTINGS to hospital on day of surgery. 21. During flu season no children under the age of 15 are permitted in the hospital for the safety of all patients. 22. Other                  Please call AMBULATORY CARE if you have any further questions.    [address] [facility]     [address]

## 2020-11-09 ENCOUNTER — ANESTHESIA EVENT (OUTPATIENT)
Dept: ENDOSCOPY | Age: 80
End: 2020-11-09
Payer: MEDICARE

## 2020-11-09 ENCOUNTER — HOSPITAL ENCOUNTER (OUTPATIENT)
Age: 80
Setting detail: OUTPATIENT SURGERY
Discharge: HOME | End: 2020-11-09
Attending: INTERNAL MEDICINE | Admitting: INTERNAL MEDICINE
Payer: MEDICARE

## 2020-11-09 ENCOUNTER — ANESTHESIA (OUTPATIENT)
Dept: ENDOSCOPY | Age: 80
End: 2020-11-09
Payer: MEDICARE

## 2020-11-09 VITALS
HEART RATE: 57 BPM | SYSTOLIC BLOOD PRESSURE: 138 MMHG | RESPIRATION RATE: 20 BRPM | OXYGEN SATURATION: 97 % | DIASTOLIC BLOOD PRESSURE: 70 MMHG | HEIGHT: 64 IN | BODY MASS INDEX: 31.1 KG/M2 | WEIGHT: 182.2 LBS | TEMPERATURE: 97.9 F

## 2020-11-09 VITALS
DIASTOLIC BLOOD PRESSURE: 57 MMHG | SYSTOLIC BLOOD PRESSURE: 107 MMHG | OXYGEN SATURATION: 97 % | RESPIRATION RATE: 28 BRPM

## 2020-11-09 PROCEDURE — 2580000003 HC RX 258: Performed by: ANESTHESIOLOGY

## 2020-11-09 PROCEDURE — 6360000002 HC RX W HCPCS: Performed by: ANESTHESIOLOGIST ASSISTANT

## 2020-11-09 PROCEDURE — 3700000001 HC ADD 15 MINUTES (ANESTHESIA): Performed by: INTERNAL MEDICINE

## 2020-11-09 PROCEDURE — 88305 TISSUE EXAM BY PATHOLOGIST: CPT

## 2020-11-09 PROCEDURE — 3700000000 HC ANESTHESIA ATTENDED CARE: Performed by: INTERNAL MEDICINE

## 2020-11-09 PROCEDURE — 7100000010 HC PHASE II RECOVERY - FIRST 15 MIN: Performed by: INTERNAL MEDICINE

## 2020-11-09 PROCEDURE — 3609010700 HC COLONOSCOPY POLYPECTOMY REMOVAL SNARE/STOMA: Performed by: INTERNAL MEDICINE

## 2020-11-09 PROCEDURE — 2709999900 HC NON-CHARGEABLE SUPPLY: Performed by: INTERNAL MEDICINE

## 2020-11-09 PROCEDURE — 7100000011 HC PHASE II RECOVERY - ADDTL 15 MIN: Performed by: INTERNAL MEDICINE

## 2020-11-09 RX ORDER — ASPIRIN 325 MG
325 TABLET ORAL DAILY
Qty: 30 TABLET | Refills: 3 | COMMUNITY
Start: 2020-11-09 | End: 2022-02-01

## 2020-11-09 RX ORDER — SODIUM CHLORIDE 0.9 % (FLUSH) 0.9 %
10 SYRINGE (ML) INJECTION PRN
Status: DISCONTINUED | OUTPATIENT
Start: 2020-11-09 | End: 2020-11-09 | Stop reason: HOSPADM

## 2020-11-09 RX ORDER — ASPIRIN 325 MG
325 TABLET ORAL DAILY
Qty: 30 TABLET | Refills: 3 | COMMUNITY
Start: 2020-11-16 | End: 2020-11-09 | Stop reason: SDUPTHER

## 2020-11-09 RX ORDER — SODIUM CHLORIDE, SODIUM LACTATE, POTASSIUM CHLORIDE, CALCIUM CHLORIDE 600; 310; 30; 20 MG/100ML; MG/100ML; MG/100ML; MG/100ML
INJECTION, SOLUTION INTRAVENOUS CONTINUOUS
Status: DISCONTINUED | OUTPATIENT
Start: 2020-11-09 | End: 2020-11-09 | Stop reason: HOSPADM

## 2020-11-09 RX ORDER — PROPOFOL 10 MG/ML
INJECTION, EMULSION INTRAVENOUS PRN
Status: DISCONTINUED | OUTPATIENT
Start: 2020-11-09 | End: 2020-11-09 | Stop reason: SDUPTHER

## 2020-11-09 RX ADMIN — SODIUM CHLORIDE, POTASSIUM CHLORIDE, SODIUM LACTATE AND CALCIUM CHLORIDE: 600; 310; 30; 20 INJECTION, SOLUTION INTRAVENOUS at 09:22

## 2020-11-09 RX ADMIN — SODIUM CHLORIDE, POTASSIUM CHLORIDE, SODIUM LACTATE AND CALCIUM CHLORIDE: 600; 310; 30; 20 INJECTION, SOLUTION INTRAVENOUS at 09:58

## 2020-11-09 RX ADMIN — PROPOFOL 180 MG: 10 INJECTION, EMULSION INTRAVENOUS at 10:07

## 2020-11-09 ASSESSMENT — PAIN SCALES - GENERAL
PAINLEVEL_OUTOF10: 0
PAINLEVEL_OUTOF10: 0

## 2020-11-09 ASSESSMENT — PAIN - FUNCTIONAL ASSESSMENT: PAIN_FUNCTIONAL_ASSESSMENT: 0-10

## 2020-11-09 NOTE — H&P
Department of Internal Medicine        HISTORY OF PRESENT ILLNESS:      The patient is a [de-identified] y.o. male who presents with history of having tubular adenoma cecum, ascending colon, sigmoid colon 2017. Patient was supposed to come back for repeat colonoscopy 2019. Past Medical History:    Past Medical History:   Diagnosis Date    BPH (benign prostatic hyperplasia)     Cancer ([facility]     skin    Hearing disorder     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  some hearing rt   no aids    Hyperlipidemia     Hypothyroidism 10/12/2018    Kidney stone     Prolonged emergence from general anesthesia     TAKES LONGER TO WAKE UP AFTER ANESTH    Seizures ([facility]     petit mal last 1 week ago   last grand mal  years ago    Sleep apnea     doesnt use cpap    Thyroid disease     UTI (urinary tract infection) 10/12/2018     Past Surgical History:    Past Surgical History:   Procedure Laterality Date    COLONOSCOPY     [facility]    all teeth removed    KIDNEY STONE SURGERY  [id number]    also bladder stone surgery    SKIN BIOPSY      basel cell    TURP  01/16/2018    cystoscopy    VASECTOMY         Medications Prior to Admission:    @  Prior to Admission medications    Medication Sig Start Date End Date Taking? Authorizing Provider   atorvastatin (LIPITOR) 20 MG tablet Take 20 mg by mouth daily   Yes Historical Provider, MD   Multiple Vitamins-Minerals (THERAPEUTIC MULTIVITAMIN-MINERALS) tablet Take 1 tablet by mouth daily. Yes Historical Provider, MD   Omega-3 Fatty Acids (FISH OIL) 1000 MG CAPS Take 1,000 mg by mouth 2 times daily. Yes Historical Provider, MD   levothyroxine (SYNTHROID) 100 MCG tablet Take 100 mcg by mouth daily. Yes Historical Provider, MD   aspirin 325 MG tablet Take 325 mg by mouth daily Ld 11/3/2020   Yes Historical Provider, MD   phenytoin (DILANTIN) 100 MG ER capsule Take 200 mg by mouth 2 times daily. Historical Provider, MD       Allergies:  Patient has no known allergies.     Social History:   Social History     Socioeconomic History    Marital status:      Spouse name: paulina    Number of children: 2    Years of education: Not on file    Highest education level: Not on file   Occupational History    Not on file   Social Needs    Financial resource strain: Not on file    Food insecurity     Worry: Not on file     Inability: Not on file   [employer] needs     Medical: Not on file     Non-medical: Not on file   Tobacco Use    Smoking status: Never Smoker    Smokeless tobacco: Never Used   Substance and Sexual Activity    Alcohol use: No    Drug use: No    Sexual activity: Not on file   Lifestyle    Physical activity     Days per week: Not on file     Minutes per session: Not on file    Stress: Not on file   Relationships    Social connections     Talks on phone: Not on file     Gets together: Not on file     Attends Jehovah's witness service: Not on file     Active member of club or organization: Not on file     Attends meetings of clubs or organizations: Not on file     Relationship status: Not on file    Intimate partner violence     Fear of current or ex partner: Not on file     Emotionally abused: Not on file     Physically abused: Not on file     Forced sexual activity: Not on file   Other Topics Concern    Not on file   Social History Narrative    Not on file       Family History:   History reviewed. No pertinent family history. REVIEW OF SYSTEMS:    Gen: Patient denies any lightheadedness or dizziness. No LOC or syncope. No fevers or chills. HEENT: No earache, sore throat or nasal congestion. Resp: Denies cough, hemoptysis or sputum production. Cardiac: Denies chest pain, SOB, diaphoresis or palpitations. GI: No nausea, vomiting, diarrhea or constipation. No melena or hematochezia. : No urinary complaints, dysuria, hematuria or frequency. MSK: No extremity weakness, paralysis or paresthesias.      PHYSICAL EXAM:    Vitals:  Ht 5' 4\" (1.626 Results   Component Value Date    COLORU Yellow 10/12/2018    PROTEINU Negative 10/12/2018    PHUR 7.0 10/12/2018    WBCUA 5-10 10/12/2018    RBCUA 1-3 10/12/2018    BACTERIA MANY 10/12/2018    CLARITYU Clear 10/12/2018    SPECGRAV 1.020 10/12/2018    LEUKOCYTESUR Negative 10/12/2018    UROBILINOGEN 0.2 10/12/2018    BILIRUBINUR Negative 10/12/2018    BLOODU TRACE 10/12/2018    GLUCOSEU Negative 10/12/2018     ABG:  No results found for: PH, PCO2, PO2, HCO3, BE, THGB, TCO2, O2SAT  HgBA1c:    Lab Results   Component Value Date    LABA1C 5.6 10/13/2018     FLP:    Lab Results   Component Value Date    TRIG 154 10/13/2018    HDL 43 10/13/2018    LDLCALC 97 10/13/2018    LABVLDL 31 10/13/2018     TSH:    Lab Results   Component Value Date    TSH 2.680 10/12/2018     IRON:  No results found for: IRON  LIPASE:    Lab Results   Component Value Date    LIPASE 23 02/01/2016       ASSESSMENT AND PLAN:      Patient Active Problem List    Diagnosis Date Noted    Seizures ([facility] [address] [facility] 10/12/2018    Hyperlipidemia 10/12/2018    Hypothyroidism 10/12/2018    Bladder stones 01/16/2018    BPH (benign prostatic hyperplasia) 01/13/2018     Impression:  1. History of tubular adenoma in cecum, ascending colon, sigmoid colon 2017  2.   History of sleep apnea-noncompliant with CPAP    Plan:  Colonoscopy with MAC with possible biopsy/polypectomy    Audrey Knott D.O.  11/9/2020  8:43 AM

## 2020-11-09 NOTE — PROGRESS NOTES
11/9/20 1200 reviewed discharge instructions with pt and his wife.  Both verbalized understanding, signed in agreement and given copy. jesenia bond

## 2020-11-09 NOTE — ANESTHESIA POSTPROCEDURE EVALUATION
Department of Anesthesiology  Postprocedure Note    Patient: Debo Yo  MRN: [id number]  YOB: 1940  Date of evaluation: 11/9/2020  Time:  4:29 PM     Procedure Summary     Date:  11/09/20 Room / Location:  [address] [place] [address] / [address] [place] [address]    Anesthesia Start:  7788 Anesthesia Stop:  3456    Procedure:  COLONOSCOPY POLYPECTOMY REMOVAL SNARE/STOMA (N/A ) Diagnosis:  (SCREENING HISTORY OF TUBULAR ADENOMA)    Surgeon:  Sharonda Fletcher DO Responsible Provider:  Bobby Baker MD    Anesthesia Type:  MAC ASA Status:  3          Anesthesia Type: MAC    Lance Phase I: Lance Score: 10    Lance Phase II: Lance Score: 10    Last vitals: Reviewed and per EMR flowsheets.        Anesthesia Post Evaluation    Patient location during evaluation: PACU  Patient participation: complete - patient participated  Level of consciousness: awake and alert  Airway patency: patent  Nausea & Vomiting: no vomiting and no nausea  Complications: no  Cardiovascular status: hemodynamically stable  Respiratory status: acceptable  Hydration status: stable

## 2020-11-09 NOTE — ANESTHESIA PRE PROCEDURE
Department of Anesthesiology  Preprocedure Note       Name:  Alfred Reasons   Age:  [de-identified] y.o.  :  1940                                          MRN:  [id number]         Date:  2020      Surgeon: Anurag Romo):  Dejah El DO    Procedure: Procedure(s):  COLONOSCOPY    Medications prior to admission:   Prior to Admission medications    Medication Sig Start Date End Date Taking? Authorizing Provider   atorvastatin (LIPITOR) 20 MG tablet Take 20 mg by mouth daily   Yes Historical Provider, MD   Multiple Vitamins-Minerals (THERAPEUTIC MULTIVITAMIN-MINERALS) tablet Take 1 tablet by mouth daily. Yes Historical Provider, MD   Omega-3 Fatty Acids (FISH OIL) 1000 MG CAPS Take 1,000 mg by mouth 2 times daily. Yes Historical Provider, MD   levothyroxine (SYNTHROID) 100 MCG tablet Take 100 mcg by mouth daily. Yes Historical Provider, MD   aspirin 325 MG tablet Take 325 mg by mouth daily Ld 11/3/2020   Yes Historical Provider, MD   phenytoin (DILANTIN) 100 MG ER capsule Take 200 mg by mouth 2 times daily.       Historical Provider, MD       Current medications:    Current Facility-Administered Medications   Medication Dose Route Frequency Provider Last Rate Last Dose    sodium chloride flush 0.9 % injection 10 mL  10 mL Intravenous PRN Dejah El DO        lactated ringers infusion   Intravenous Continuous Mamadou Hu MD           Allergies:  No Known Allergies    Problem List:    Patient Active Problem List   Diagnosis Code    BPH (benign prostatic hyperplasia) N40.0    Bladder stones N21.0    Seizures ([address] R56.9    Hyperlipidemia E78.5    Hypothyroidism E03.9       Past Medical History:        Diagnosis Date    BPH (benign prostatic hyperplasia)     Cancer ([facility] [address]     skin    Hearing disorder     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  some hearing rt   no aids    Hyperlipidemia     Hypothyroidism 10/12/2018    Kidney stone     Prolonged emergence from general anesthesia     TAKES LONGER TO WAKE UP AFTER ANESTH    Seizures ([address]     petit mal last 1 week ago   last grand mal  years ago    Sleep apnea     doesnt use cpap    Thyroid disease     UTI (urinary tract infection) 10/12/2018       Past Surgical History:        Procedure Laterality Date    COLONOSCOPY     [address]    all teeth removed    KIDNEY STONE SURGERY  [id number]    also bladder stone surgery    SKIN BIOPSY      basel cell    TURP  01/16/2018    cystoscopy    VASECTOMY         Social History:    Social History     Tobacco Use    Smoking status: Never Smoker    Smokeless tobacco: Never Used   Substance Use Topics    Alcohol use: No                                Counseling given: Not Answered      Vital Signs (Current):   Vitals:    11/06/20 0857 11/09/20 0849   BP:  (!) 141/84   Pulse:  66   Resp:  20   Temp:  97.4 °F (36.3 °C)   TempSrc:  Temporal   SpO2:  97%   Weight: 185 lb (83.9 kg) 182 lb 3.2 oz (82.6 kg)   Height: 5' 4\" (1.626 m) 5' 4\" (1.626 m)                                              BP Readings from Last 3 Encounters:   11/09/20 (!) 141/84   10/14/18 113/68   01/17/18 126/62       NPO Status: Time of last liquid consumption: 1800                        Time of last solid consumption: 1800                        Date of last liquid consumption: 11/08/20                        Date of last solid food consumption: 11/07/20    BMI:   Wt Readings from Last 3 Encounters:   11/09/20 182 lb 3.2 oz (82.6 kg)   10/12/18 189 lb 9.6 oz (86 kg)   01/16/18 184 lb (83.5 kg)     Body mass index is 31.27 kg/m².     CBC:   Lab Results   Component Value Date    WBC 10.4 10/14/2018    RBC 4.60 10/14/2018    HGB 14.6 10/14/2018    HCT 43.8 10/14/2018    MCV 95.2 10/14/2018    RDW 12.7 10/14/2018     10/14/2018       CMP:   Lab Results   Component Value Date     10/14/2018    K 4.4 10/14/2018     10/14/2018    CO2 24 10/14/2018    BUN 18 10/14/2018    CREATININE 0.7 10/14/2018    GFRAA >60 10/14/2018 LABGLOM >60 10/14/2018    GLUCOSE 121 10/14/2018    GLUCOSE 92 01/10/2012    PROT 6.8 10/14/2018    CALCIUM 8.7 10/14/2018    BILITOT 0.4 10/14/2018    ALKPHOS 61 10/14/2018    AST 27 10/14/2018    ALT 28 10/14/2018       POC Tests: No results for input(s): POCGLU, POCNA, POCK, POCCL, POCBUN, POCHEMO, POCHCT in the last 72 hours. Coags:   Lab Results   Component Value Date    PROTIME 11.1 10/12/2018    INR 1.0 10/12/2018    APTT 27.8 10/12/2018       HCG (If Applicable): No results found for: PREGTESTUR, PREGSERUM, HCG, HCGQUANT     ABGs: No results found for: PHART, PO2ART, YKD5AAM, ATV4XFG, BEART, Q2HMVIHU     Type & Screen (If Applicable):  No results found for: LABABO, LABRH    Drug/Infectious Status (If Applicable):  No results found for: HIV, HEPCAB    COVID-19 Screening (If Applicable):   Lab Results   Component Value Date    COVID19 Not Detected 11/04/2020         Anesthesia Evaluation  Patient summary reviewed  Airway: Mallampati: II  TM distance: >3 FB   Neck ROM: full  Mouth opening: > = 3 FB Dental:    (+) upper dentures and lower dentures      Pulmonary: breath sounds clear to auscultation  (+) sleep apnea:                             Cardiovascular:  Exercise tolerance: good (>4 METS),   (+) hyperlipidemia    (-) hypertension    ECG reviewed  Rhythm: regular  Rate: normal  Echocardiogram reviewed                  Neuro/Psych:   (+) seizures: well controlled and no interval change,    (-) CVA and headaches            ROS comment: Severe [place] GI/Hepatic/Renal:   (+) renal disease: kidney stones, bowel prep,      (-) PUD       Endo/Other:    (+) hypothyroidism::., .                 Abdominal:         (-) obese     Vascular: negative vascular ROS. Anesthesia Plan      MAC     ASA 3       Induction: intravenous. MIPS: Prophylactic antiemetics administered. Anesthetic plan and risks discussed with patient. Plan discussed with CRNA.                   Serafin Hill

## 2020-11-09 NOTE — OP NOTE
[address]                                OPERATIVE REPORT    PATIENT NAME: Heri GUILLERMO                         :        1940  MED REC NO:   [id number]                            ROOM:  ACCOUNT NO:   [de-identified]                           ADMIT DATE: 2020  PROVIDER:     Arpit Mclean DO    DATE OF PROCEDURE:  2020    PATIENT OF:  Alejandra Baum DO    SURGEON:  Arpit Mclean DO    PREOPERATIVE DIAGNOSIS:  History of tubular adenoma of the cecum,  ascending and sigmoid colon. POSTOPERATIVE DIAGNOSES:  1. Cecal fold polyp. 2.  Sigmoid diverticulosis. PROCEDURE PERFORMED:  Colonoscopy. PREOPERATIVE MEDICATION:  MAC.    ESTIMATED BLOOD LOSS:  0.5 mL. COMPLICATIONS:  None. DESCRIPTION OF PROCEDURE:  This 77-year-old male was placed in left  lateral decubitus position after preop medication was given. The  patient was very comfortable throughout the test.  An Olympus video  colonoscope was inserted through the anal sphincter and passed to the  cecum without difficulty. The colon prep was good. This cecum well visualized with the patient having a 6-mm flat sessile  polyp noted on the cecal fold which was removed with forceps. The ascending colon, transverse and descending colon are well  visualized. There are no masses, polyps or significant inflammation. The sigmoid colon and rectum were well visualized. The sigmoid colon  had some mild to moderate scattered diverticulosis. There are no  masses, polyps or significant inflammation. Pending pathology report, the patient may need a repeat colonoscopy in  3-5 years if his health is in excellent condition.         Nay Owusu DO    D: 2020 10:29:07       T: 2020 10:33:00     [email or web address]  Job#: [id number]     Doc#: [id number]    CC:

## 2021-06-11 ENCOUNTER — APPOINTMENT (OUTPATIENT)
Dept: CT IMAGING | Age: 81
End: 2021-06-11
Payer: MEDICARE

## 2021-06-11 ENCOUNTER — HOSPITAL ENCOUNTER (EMERGENCY)
Age: 81
Discharge: HOME | End: 2021-06-11
Attending: EMERGENCY MEDICINE
Payer: MEDICARE

## 2021-06-11 VITALS
WEIGHT: 185 LBS | BODY MASS INDEX: 31.58 KG/M2 | HEART RATE: 63 BPM | RESPIRATION RATE: 18 BRPM | OXYGEN SATURATION: 99 % | TEMPERATURE: 97.8 F | DIASTOLIC BLOOD PRESSURE: 81 MMHG | SYSTOLIC BLOOD PRESSURE: 162 MMHG | HEIGHT: 64 IN

## 2021-06-11 DIAGNOSIS — N39.0 URINARY TRACT INFECTION WITHOUT HEMATURIA, SITE UNSPECIFIED: Primary | ICD-10-CM

## 2021-06-11 DIAGNOSIS — R91.1 LUNG NODULE: ICD-10-CM

## 2021-06-11 LAB
ANION GAP SERPL CALCULATED.3IONS-SCNC: 8 MMOL/L (ref 7–16)
BACTERIA: ABNORMAL /HPF
BASOPHILS ABSOLUTE: 0.04 E9/L (ref 0–0.2)
BASOPHILS RELATIVE PERCENT: 0.6 % (ref 0–2)
BILIRUBIN URINE: NEGATIVE
BLOOD, URINE: ABNORMAL
BUN BLDV-MCNC: 17 MG/DL (ref 6–23)
CALCIUM SERPL-MCNC: 9.5 MG/DL (ref 8.6–10.2)
CHLORIDE BLD-SCNC: 104 MMOL/L (ref 98–107)
CLARITY: CLEAR
CO2: 27 MMOL/L (ref 22–29)
COLOR: ABNORMAL
CREAT SERPL-MCNC: 0.7 MG/DL (ref 0.7–1.2)
EOSINOPHILS ABSOLUTE: 0.13 E9/L (ref 0.05–0.5)
EOSINOPHILS RELATIVE PERCENT: 1.9 % (ref 0–6)
GFR AFRICAN AMERICAN: >60
GFR NON-AFRICAN AMERICAN: >60 ML/MIN/1.73
GLUCOSE BLD-MCNC: 150 MG/DL (ref 74–99)
GLUCOSE URINE: NEGATIVE MG/DL
HCT VFR BLD CALC: 47.9 % (ref 37–54)
HEMOGLOBIN: 16.1 G/DL (ref 12.5–16.5)
IMMATURE GRANULOCYTES #: 0.01 E9/L
IMMATURE GRANULOCYTES %: 0.1 % (ref 0–5)
KETONES, URINE: NEGATIVE MG/DL
LEUKOCYTE ESTERASE, URINE: ABNORMAL
LYMPHOCYTES ABSOLUTE: 2.53 E9/L (ref 1.5–4)
LYMPHOCYTES RELATIVE PERCENT: 36.4 % (ref 20–42)
MCH RBC QN AUTO: 33 PG (ref 26–35)
MCHC RBC AUTO-ENTMCNC: 33.6 % (ref 32–34.5)
MCV RBC AUTO: 98.2 FL (ref 80–99.9)
MONOCYTES ABSOLUTE: 0.52 E9/L (ref 0.1–0.95)
MONOCYTES RELATIVE PERCENT: 7.5 % (ref 2–12)
NEUTROPHILS ABSOLUTE: 3.73 E9/L (ref 1.8–7.3)
NEUTROPHILS RELATIVE PERCENT: 53.5 % (ref 43–80)
NITRITE, URINE: POSITIVE
PDW BLD-RTO: 12.5 FL (ref 11.5–15)
PH UA: 6 (ref 5–9)
PLATELET # BLD: 192 E9/L (ref 130–450)
PMV BLD AUTO: 10.1 FL (ref 7–12)
POTASSIUM REFLEX MAGNESIUM: 4.2 MMOL/L (ref 3.5–5)
PROTEIN UA: NEGATIVE MG/DL
RBC # BLD: 4.88 E12/L (ref 3.8–5.8)
RBC UA: ABNORMAL /HPF (ref 0–2)
SODIUM BLD-SCNC: 139 MMOL/L (ref 132–146)
SPECIFIC GRAVITY UA: >=1.03 (ref 1–1.03)
UROBILINOGEN, URINE: 0.2 E.U./DL
WBC # BLD: 7 E9/L (ref 4.5–11.5)
WBC UA: >20 /HPF (ref 0–5)

## 2021-06-11 PROCEDURE — 6360000002 HC RX W HCPCS: Performed by: EMERGENCY MEDICINE

## 2021-06-11 PROCEDURE — 85025 COMPLETE CBC W/AUTO DIFF WBC: CPT

## 2021-06-11 PROCEDURE — 99283 EMERGENCY DEPT VISIT LOW MDM: CPT

## 2021-06-11 PROCEDURE — 96361 HYDRATE IV INFUSION ADD-ON: CPT

## 2021-06-11 PROCEDURE — 80048 BASIC METABOLIC PNL TOTAL CA: CPT

## 2021-06-11 PROCEDURE — 87088 URINE BACTERIA CULTURE: CPT

## 2021-06-11 PROCEDURE — 70450 CT HEAD/BRAIN W/O DYE: CPT

## 2021-06-11 PROCEDURE — 2580000003 HC RX 258: Performed by: EMERGENCY MEDICINE

## 2021-06-11 PROCEDURE — 96365 THER/PROPH/DIAG IV INF INIT: CPT

## 2021-06-11 PROCEDURE — 81001 URINALYSIS AUTO W/SCOPE: CPT

## 2021-06-11 PROCEDURE — 6370000000 HC RX 637 (ALT 250 FOR IP): Performed by: EMERGENCY MEDICINE

## 2021-06-11 PROCEDURE — 74176 CT ABD & PELVIS W/O CONTRAST: CPT

## 2021-06-11 PROCEDURE — 87186 SC STD MICRODIL/AGAR DIL: CPT

## 2021-06-11 RX ORDER — ACETAMINOPHEN 500 MG
1000 TABLET ORAL ONCE
Status: COMPLETED | OUTPATIENT
Start: 2021-06-11 | End: 2021-06-11

## 2021-06-11 RX ORDER — CEFDINIR 300 MG/1
300 CAPSULE ORAL 2 TIMES DAILY
Qty: 10 CAPSULE | Refills: 0 | Status: SHIPPED | OUTPATIENT
Start: 2021-06-11 | End: 2021-06-16

## 2021-06-11 RX ORDER — 0.9 % SODIUM CHLORIDE 0.9 %
1000 INTRAVENOUS SOLUTION INTRAVENOUS ONCE
Status: COMPLETED | OUTPATIENT
Start: 2021-06-11 | End: 2021-06-11

## 2021-06-11 RX ADMIN — SODIUM CHLORIDE 1000 ML: 9 INJECTION, SOLUTION INTRAVENOUS at 11:49

## 2021-06-11 RX ADMIN — WATER 1000 MG: 1 INJECTION INTRAMUSCULAR; INTRAVENOUS; SUBCUTANEOUS at 13:22

## 2021-06-11 RX ADMIN — ACETAMINOPHEN 1000 MG: 500 TABLET, FILM COATED ORAL at 11:48

## 2021-06-11 ASSESSMENT — ENCOUNTER SYMPTOMS
NAUSEA: 0
BLOOD IN STOOL: 0
BACK PAIN: 0
SHORTNESS OF BREATH: 0
COUGH: 0
PHOTOPHOBIA: 0
DIARRHEA: 0
COLOR CHANGE: 0
VOMITING: 0
ABDOMINAL PAIN: 0
ABDOMINAL DISTENTION: 0
RHINORRHEA: 0

## 2021-06-11 ASSESSMENT — PAIN DESCRIPTION - LOCATION: LOCATION: HEAD

## 2021-06-11 ASSESSMENT — PAIN DESCRIPTION - DESCRIPTORS: DESCRIPTORS: HEADACHE

## 2021-06-11 ASSESSMENT — PAIN SCALES - GENERAL: PAINLEVEL_OUTOF10: 5

## 2021-06-11 ASSESSMENT — PAIN DESCRIPTION - PAIN TYPE: TYPE: ACUTE PAIN

## 2021-06-11 ASSESSMENT — PAIN DESCRIPTION - FREQUENCY: FREQUENCY: CONTINUOUS

## 2021-06-11 NOTE — ED PROVIDER NOTES
Patient presents to the ED with the complaint of decreased urination. States he has to urinate frequently but does not have much urine when he does. Denies any discomfort urinating. Denies any associated abdominal pain. Denies any associated nausea, vomiting, fever, or chills. Patient states that he has a history of bladder stones and kidney stones and wonders if there is something causing an obstruction. Has not noted any blood in the urine. Has not noted anything to make his symptoms better or worse. Has been drinking fluids without difficulty. Patient also reports having a headache. Its in the back of his head. Moderate in severity. States it started gradually just prior to arrival. States he has had headaches like this which have been increasing over the past several weeks. Denies any change in vision. Denies any numbness or weakness in his upper or lower extremities. Denies any neck pain or stiffness. Has not noted anything to make the headache better or worse. No associated photophobia. Symptoms have been persistent. Review of Systems   Constitutional: Negative for chills, diaphoresis, fatigue and fever. HENT: Negative for congestion and rhinorrhea. Eyes: Negative for photophobia and visual disturbance. Respiratory: Negative for cough and shortness of breath. Cardiovascular: Negative for chest pain and palpitations. Gastrointestinal: Negative for abdominal distention, abdominal pain, blood in stool, diarrhea, nausea and vomiting. Genitourinary: Positive for decreased urine volume and difficulty urinating. Negative for dysuria and flank pain. Musculoskeletal: Negative for back pain, neck pain and neck stiffness. Skin: Negative for color change and pallor. Neurological: Positive for headaches. Negative for dizziness, syncope and light-headedness. Hematological: Does not bruise/bleed easily. Psychiatric/Behavioral: Negative for confusion.    All other systems reviewed and are negative. Physical Exam  Vitals and nursing note reviewed. Constitutional:       General: He is not in acute distress. Appearance: He is well-developed. He is not diaphoretic. Comments: Patient sitting in chair in no acute distress. HENT:      Head: Normocephalic and atraumatic. Eyes:      Conjunctiva/sclera: Conjunctivae normal.   Cardiovascular:      Rate and Rhythm: Normal rate and regular rhythm. Heart sounds: Normal heart sounds. No murmur heard. Pulmonary:      Effort: Pulmonary effort is normal. No respiratory distress. Breath sounds: Normal breath sounds. No wheezing or rales. Abdominal:      General: Bowel sounds are normal. There is no distension. Palpations: Abdomen is soft. Tenderness: There is no abdominal tenderness. There is no right CVA tenderness, left CVA tenderness, guarding or rebound. Musculoskeletal:      Cervical back: Normal range of motion and neck supple. No rigidity ( No signs of meningismus). Comments: There is no pretibial edema nor calf tenderness bilaterally    Skin:     General: Skin is warm and dry. Coloration: Skin is not jaundiced or pale. Findings: No rash. Neurological:      Mental Status: He is alert and oriented to person, place, and time. Comments: Sensation grossly intact. No focal neurological deficits. No ataxia with finger-to-nose. Procedures     MDM   Patient presents to the ED for frequent urination and hesitancy. CBC showed no evidence of leukocytosis or anemia. BMP showed no evidence of electrolyte abnormalities or renal insufficiency. Urinalysis did show evidence of infection. Patient was also having headaches and therefore CT of the head was obtained which showed no acute intracranial findings. CT down pelvis was obtained which showed a 2 mm nonobstructing left kidney stone. No hydronephrosis or obstructive uropathy.   There was also perinephric stranding which could be related to renal failure. Patient's renal functions today are normal.  A spiculated density in the right lung base was also slightly more prominent than previous studies. This could be scarring. Discussed this with the family and patient and he understands importance of following up with Dr. Santa Walker for evaluation of this within 4 to 6 months. Patient is going to be discharged home and will follow up with his PCP. ED Course as of Jun 11 1315   Fri Jun 11, 2021   1249 Patient resting in bed in no distress. Discussed results of labs and imaging with him. Discussed that he has a small nodule on the right lung base. This will need follow-up with his PCP for further evaluation. Discussed the presence of the left-sided kidney stone. Awaiting urinalysis results. [MS]   1313 Test results of urinalysis. We will place him on antibiotic. He is getting received IV dose of Rocephin here and then will be discharged home on [address] He is comfortably discharged home and will follow up with his PCP. He understands if he is worsening symptoms or new concerns that he can return to the ED for further evaluation. [MS]      ED Course User Index  [MS] Araceli Daniels, DO       --------------------------------------------- PAST HISTORY ---------------------------------------------  Past Medical History:  has a past medical history of BPH (benign prostatic hyperplasia), Cancer ([facility] [address] Hearing disorder, Hyperlipidemia, Hypothyroidism, Kidney stone, Prolonged emergence from general anesthesia, Seizures ([facility] [address] Sleep apnea, Thyroid disease, and UTI (urinary tract infection). Past Surgical History:  has a past surgical history that includes Kidney stone surgery ([id number]); Vasectomy; Dental surgery (1969); Colonoscopy; TURP (01/16/2018); skin biopsy; and Colonoscopy (N/A, 11/9/2020). Social History:  reports that he has never smoked.  He has never used smokeless tobacco. He reports that he does not drink alcohol and does not use 9.0    Protein, UA Negative Negative mg/dL    Urobilinogen, Urine 0.2 <2.0 E.U./dL    Nitrite, Urine POSITIVE (A) Negative    Leukocyte Esterase, Urine SMALL (A) Negative   Microscopic Urinalysis   Result Value Ref Range    WBC, UA >20 (A) 0 - 5 /HPF    RBC, UA 0-1 0 - 2 /HPF    Bacteria, UA MANY (A) None Seen /HPF       Radiology:  CT HEAD WO CONTRAST   Final Result   1. There is no acute intracranial abnormality. Specifically, there is no   intracranial hemorrhage. 2. Atrophy and periventricular leukomalacia,         CT ABDOMEN PELVIS WO CONTRAST Additional Contrast? None   Final Result   2 mm nonobstructing left kidney stone. There is no hydronephrosis or   obstructive uropathy. There is perinephric stranding which could be due to   renal failure. There is no acute inflammation. A spiculated density in the right lung base which is slightly more prominent   than the previous study probably scarring. Consider surveillance in 4-6   months.             ------------------------- NURSING NOTES AND VITALS REVIEWED ---------------------------  Date / Time Roomed:  6/11/2021 11:30 AM  ED Bed Assignment:  26/26    The nursing notes within the ED encounter and vital signs as below have been reviewed. BP (!) 162/81   Pulse 63   Temp 97.8 °F (36.6 °C) (Oral)   Resp 18   Ht 5' 4\" (1.626 m)   Wt 185 lb (83.9 kg)   SpO2 99%   BMI 31.76 kg/m²   Oxygen Saturation Interpretation: Normal      ------------------------------------------ PROGRESS NOTES ------------------------------------------  I have spoken with the patient and discussed todays results, in addition to providing specific details for the plan of care and counseling regarding the diagnosis and prognosis. Their questions are answered at this time and they are agreeable with the plan. I discussed at length with them reasons for immediate return here for re evaluation.  They will followup with primary care by calling their office

## 2021-06-13 LAB
ORGANISM: ABNORMAL
URINE CULTURE, ROUTINE: ABNORMAL

## 2021-07-20 ENCOUNTER — HOSPITAL ENCOUNTER (OUTPATIENT)
Age: 81
Discharge: HOME | End: 2021-07-20
Payer: MEDICARE

## 2021-07-20 LAB
ALBUMIN SERPL-MCNC: 4.1 G/DL (ref 3.5–5.2)
ALP BLD-CCNC: 74 U/L (ref 40–129)
ALT SERPL-CCNC: 25 U/L (ref 0–40)
ANION GAP SERPL CALCULATED.3IONS-SCNC: 9 MMOL/L (ref 7–16)
AST SERPL-CCNC: 28 U/L (ref 0–39)
BASOPHILS ABSOLUTE: 0.03 E9/L (ref 0–0.2)
BASOPHILS RELATIVE PERCENT: 0.4 % (ref 0–2)
BILIRUB SERPL-MCNC: 0.4 MG/DL (ref 0–1.2)
BUN BLDV-MCNC: 18 MG/DL (ref 6–23)
CALCIUM SERPL-MCNC: 9.4 MG/DL (ref 8.6–10.2)
CHLORIDE BLD-SCNC: 105 MMOL/L (ref 98–107)
CHOLESTEROL, TOTAL: 174 MG/DL (ref 0–199)
CO2: 28 MMOL/L (ref 22–29)
CREAT SERPL-MCNC: 0.7 MG/DL (ref 0.7–1.2)
EOSINOPHILS ABSOLUTE: 0.14 E9/L (ref 0.05–0.5)
EOSINOPHILS RELATIVE PERCENT: 2 % (ref 0–6)
GFR AFRICAN AMERICAN: >60
GFR NON-AFRICAN AMERICAN: >60 ML/MIN/1.73
GLUCOSE BLD-MCNC: 87 MG/DL (ref 74–99)
HCT VFR BLD CALC: 45.6 % (ref 37–54)
HDLC SERPL-MCNC: 51 MG/DL
HEMOGLOBIN: 15.4 G/DL (ref 12.5–16.5)
IMMATURE GRANULOCYTES #: 0.02 E9/L
IMMATURE GRANULOCYTES %: 0.3 % (ref 0–5)
LDL CHOLESTEROL CALCULATED: 86 MG/DL (ref 0–99)
LYMPHOCYTES ABSOLUTE: 2.95 E9/L (ref 1.5–4)
LYMPHOCYTES RELATIVE PERCENT: 41.3 % (ref 20–42)
MCH RBC QN AUTO: 33.6 PG (ref 26–35)
MCHC RBC AUTO-ENTMCNC: 33.8 % (ref 32–34.5)
MCV RBC AUTO: 99.3 FL (ref 80–99.9)
MONOCYTES ABSOLUTE: 0.67 E9/L (ref 0.1–0.95)
MONOCYTES RELATIVE PERCENT: 9.4 % (ref 2–12)
NEUTROPHILS ABSOLUTE: 3.34 E9/L (ref 1.8–7.3)
NEUTROPHILS RELATIVE PERCENT: 46.6 % (ref 43–80)
PDW BLD-RTO: 12.9 FL (ref 11.5–15)
PLATELET # BLD: 170 E9/L (ref 130–450)
PMV BLD AUTO: 10.7 FL (ref 7–12)
POTASSIUM SERPL-SCNC: 4.6 MMOL/L (ref 3.5–5)
RBC # BLD: 4.59 E12/L (ref 3.8–5.8)
SODIUM BLD-SCNC: 142 MMOL/L (ref 132–146)
TOTAL PROTEIN: 7 G/DL (ref 6.4–8.3)
TRIGL SERPL-MCNC: 185 MG/DL (ref 0–149)
TSH SERPL DL<=0.05 MIU/L-ACNC: 0.38 UIU/ML (ref 0.27–4.2)
VLDLC SERPL CALC-MCNC: 37 MG/DL
WBC # BLD: 7.2 E9/L (ref 4.5–11.5)

## 2021-07-20 PROCEDURE — 84443 ASSAY THYROID STIM HORMONE: CPT

## 2021-07-20 PROCEDURE — 80061 LIPID PANEL: CPT

## 2021-07-20 PROCEDURE — 36415 COLL VENOUS BLD VENIPUNCTURE: CPT

## 2021-07-20 PROCEDURE — 80053 COMPREHEN METABOLIC PANEL: CPT

## 2021-07-20 PROCEDURE — 85025 COMPLETE CBC W/AUTO DIFF WBC: CPT

## 2021-10-14 RX ORDER — ATORVASTATIN CALCIUM 20 MG/1
20 TABLET, FILM COATED ORAL DAILY
Qty: 90 TABLET | Refills: 0 | Status: SHIPPED
Start: 2021-10-14 | End: 2022-02-02 | Stop reason: SDUPTHER

## 2021-10-14 RX ORDER — LEVOTHYROXINE SODIUM 0.1 MG/1
100 TABLET ORAL DAILY
Qty: 90 TABLET | Refills: 0 | Status: SHIPPED
Start: 2021-10-14 | End: 2022-02-02 | Stop reason: SDUPTHER

## 2021-11-15 RX ORDER — PHENYTOIN SODIUM 100 MG/1
CAPSULE, EXTENDED RELEASE ORAL
Qty: 360 CAPSULE | Refills: 1 | Status: SHIPPED
Start: 2021-11-15 | End: 2022-05-12 | Stop reason: SDUPTHER

## 2022-02-01 RX ORDER — ASPIRIN 81 MG/1
81 TABLET ORAL DAILY
COMMUNITY
End: 2022-08-08

## 2022-02-02 ENCOUNTER — TELEPHONE (OUTPATIENT)
Dept: PRIMARY CARE CLINIC | Age: 82
End: 2022-02-02

## 2022-02-02 DIAGNOSIS — E03.9 ACQUIRED HYPOTHYROIDISM: ICD-10-CM

## 2022-02-02 DIAGNOSIS — R56.9 SEIZURES (HCC): Primary | ICD-10-CM

## 2022-02-02 DIAGNOSIS — E78.5 HYPERLIPIDEMIA, UNSPECIFIED HYPERLIPIDEMIA TYPE: ICD-10-CM

## 2022-02-02 RX ORDER — LEVOTHYROXINE SODIUM 0.1 MG/1
100 TABLET ORAL DAILY
Qty: 90 TABLET | Refills: 0 | Status: SHIPPED
Start: 2022-02-02 | End: 2022-05-12 | Stop reason: SDUPTHER

## 2022-02-02 RX ORDER — ATORVASTATIN CALCIUM 20 MG/1
20 TABLET, FILM COATED ORAL DAILY
Qty: 90 TABLET | Refills: 0 | Status: SHIPPED
Start: 2022-02-02 | End: 2022-05-12 | Stop reason: SDUPTHER

## 2022-02-02 NOTE — TELEPHONE ENCOUNTER
Pc to spouse regarding appointment tomorrow. Spouse is concerned with weather so pt is cancelling unable to do virtual visit.  Pt would like a req for labs and is going to reschedule appointment at a later date

## 2022-02-14 ENCOUNTER — HOSPITAL ENCOUNTER (OUTPATIENT)
Age: 82
Discharge: HOME | End: 2022-02-14
Payer: MEDICARE

## 2022-02-14 DIAGNOSIS — E78.5 HYPERLIPIDEMIA, UNSPECIFIED HYPERLIPIDEMIA TYPE: ICD-10-CM

## 2022-02-14 DIAGNOSIS — R56.9 SEIZURES (HCC): ICD-10-CM

## 2022-02-14 DIAGNOSIS — E03.9 ACQUIRED HYPOTHYROIDISM: ICD-10-CM

## 2022-02-14 LAB
ALBUMIN SERPL-MCNC: 4.5 G/DL (ref 3.5–5.2)
ALP BLD-CCNC: 70 U/L (ref 40–129)
ALT SERPL-CCNC: 29 U/L (ref 0–40)
ANION GAP SERPL CALCULATED.3IONS-SCNC: 12 MMOL/L (ref 7–16)
AST SERPL-CCNC: 27 U/L (ref 0–39)
BASOPHILS ABSOLUTE: 0.05 E9/L (ref 0–0.2)
BASOPHILS RELATIVE PERCENT: 0.6 % (ref 0–2)
BILIRUB SERPL-MCNC: 0.5 MG/DL (ref 0–1.2)
BUN BLDV-MCNC: 15 MG/DL (ref 6–23)
CALCIUM SERPL-MCNC: 10 MG/DL (ref 8.6–10.2)
CHLORIDE BLD-SCNC: 103 MMOL/L (ref 98–107)
CHOLESTEROL, TOTAL: 210 MG/DL (ref 0–199)
CO2: 27 MMOL/L (ref 22–29)
CREAT SERPL-MCNC: 0.8 MG/DL (ref 0.7–1.2)
EOSINOPHILS ABSOLUTE: 0.1 E9/L (ref 0.05–0.5)
EOSINOPHILS RELATIVE PERCENT: 1.1 % (ref 0–6)
GFR AFRICAN AMERICAN: >60
GFR NON-AFRICAN AMERICAN: >60 ML/MIN/1.73
GLUCOSE FASTING: 98 MG/DL (ref 74–99)
HCT VFR BLD CALC: 47.4 % (ref 37–54)
HDLC SERPL-MCNC: 56 MG/DL
HEMOGLOBIN: 16.1 G/DL (ref 12.5–16.5)
IMMATURE GRANULOCYTES #: 0.03 E9/L
IMMATURE GRANULOCYTES %: 0.3 % (ref 0–5)
LDL CHOLESTEROL CALCULATED: 98 MG/DL (ref 0–99)
LYMPHOCYTES ABSOLUTE: 2.79 E9/L (ref 1.5–4)
LYMPHOCYTES RELATIVE PERCENT: 32 % (ref 20–42)
MCH RBC QN AUTO: 33.8 PG (ref 26–35)
MCHC RBC AUTO-ENTMCNC: 34 % (ref 32–34.5)
MCV RBC AUTO: 99.4 FL (ref 80–99.9)
MONOCYTES ABSOLUTE: 0.81 E9/L (ref 0.1–0.95)
MONOCYTES RELATIVE PERCENT: 9.3 % (ref 2–12)
NEUTROPHILS ABSOLUTE: 4.94 E9/L (ref 1.8–7.3)
NEUTROPHILS RELATIVE PERCENT: 56.7 % (ref 43–80)
PDW BLD-RTO: 12.5 FL (ref 11.5–15)
PLATELET # BLD: 184 E9/L (ref 130–450)
PMV BLD AUTO: 10.2 FL (ref 7–12)
POTASSIUM SERPL-SCNC: 4.6 MMOL/L (ref 3.5–5)
RBC # BLD: 4.77 E12/L (ref 3.8–5.8)
SODIUM BLD-SCNC: 142 MMOL/L (ref 132–146)
TOTAL PROTEIN: 7.5 G/DL (ref 6.4–8.3)
TRIGL SERPL-MCNC: 278 MG/DL (ref 0–149)
TSH SERPL DL<=0.05 MIU/L-ACNC: 0.61 UIU/ML (ref 0.27–4.2)
VLDLC SERPL CALC-MCNC: 56 MG/DL
WBC # BLD: 8.7 E9/L (ref 4.5–11.5)

## 2022-02-14 PROCEDURE — 80061 LIPID PANEL: CPT

## 2022-02-14 PROCEDURE — 36415 COLL VENOUS BLD VENIPUNCTURE: CPT

## 2022-02-14 PROCEDURE — 80185 ASSAY OF PHENYTOIN TOTAL: CPT

## 2022-02-14 PROCEDURE — 80186 ASSAY OF PHENYTOIN FREE: CPT

## 2022-02-14 PROCEDURE — 84443 ASSAY THYROID STIM HORMONE: CPT

## 2022-02-14 PROCEDURE — 85025 COMPLETE CBC W/AUTO DIFF WBC: CPT

## 2022-02-14 PROCEDURE — 80053 COMPREHEN METABOLIC PANEL: CPT

## 2022-02-16 LAB
PHENYTOIN % FREE: 6.7 % (ref 8–14)
PHENYTOIN FREE: 1 UG/ML (ref 1–2.5)
PHENYTOIN LEVEL: 14.8 UG/ML (ref 10–20)

## 2022-05-12 ENCOUNTER — TELEPHONE (OUTPATIENT)
Dept: PRIMARY CARE CLINIC | Age: 82
End: 2022-05-12

## 2022-05-12 RX ORDER — ATORVASTATIN CALCIUM 20 MG/1
20 TABLET, FILM COATED ORAL DAILY
Qty: 90 TABLET | Refills: 0 | Status: SHIPPED
Start: 2022-05-12 | End: 2022-08-05

## 2022-05-12 RX ORDER — LEVOTHYROXINE SODIUM 0.1 MG/1
100 TABLET ORAL DAILY
Qty: 90 TABLET | Refills: 0 | Status: SHIPPED
Start: 2022-05-12 | End: 2022-08-08 | Stop reason: SDUPTHER

## 2022-05-12 RX ORDER — PHENYTOIN SODIUM 100 MG/1
CAPSULE, EXTENDED RELEASE ORAL
Qty: 360 CAPSULE | Refills: 1 | Status: SHIPPED
Start: 2022-05-12 | End: 2022-08-08 | Stop reason: SDUPTHER

## 2022-05-12 NOTE — TELEPHONE ENCOUNTER
Patient requesting      Phenytoin 100 mg ER  #360  Si bid    Levothyroxine 100 mcg #90  Si qd    Atorvastatin 20 mg #90  Si qd      [facility]

## 2022-08-05 RX ORDER — ATORVASTATIN CALCIUM 20 MG/1
TABLET, FILM COATED ORAL
Qty: 90 TABLET | Refills: 0 | Status: SHIPPED
Start: 2022-08-05 | End: 2022-11-01

## 2022-08-05 NOTE — TELEPHONE ENCOUNTER
Brooke Swanson had an appointment on Visit date not found and has a follow-up appointment on 8/8/2022

## 2022-08-08 ENCOUNTER — OFFICE VISIT (OUTPATIENT)
Dept: PRIMARY CARE CLINIC | Age: 82
End: 2022-08-08
Payer: MEDICARE

## 2022-08-08 VITALS
HEART RATE: 70 BPM | WEIGHT: 187 LBS | OXYGEN SATURATION: 97 % | BODY MASS INDEX: 31.92 KG/M2 | DIASTOLIC BLOOD PRESSURE: 70 MMHG | HEIGHT: 64 IN | TEMPERATURE: 97.4 F | SYSTOLIC BLOOD PRESSURE: 110 MMHG

## 2022-08-08 DIAGNOSIS — E78.5 HYPERLIPIDEMIA, UNSPECIFIED HYPERLIPIDEMIA TYPE: ICD-10-CM

## 2022-08-08 DIAGNOSIS — E03.9 ACQUIRED HYPOTHYROIDISM: ICD-10-CM

## 2022-08-08 DIAGNOSIS — R51.9 NONINTRACTABLE HEADACHE, UNSPECIFIED CHRONICITY PATTERN, UNSPECIFIED HEADACHE TYPE: ICD-10-CM

## 2022-08-08 DIAGNOSIS — M75.52 BURSITIS OF LEFT SHOULDER: ICD-10-CM

## 2022-08-08 DIAGNOSIS — R91.1 NODULE OF LOWER LOBE OF RIGHT LUNG: ICD-10-CM

## 2022-08-08 DIAGNOSIS — E78.5 HYPERLIPIDEMIA, UNSPECIFIED HYPERLIPIDEMIA TYPE: Primary | ICD-10-CM

## 2022-08-08 DIAGNOSIS — R56.9 SEIZURES (HCC): ICD-10-CM

## 2022-08-08 LAB
BASOPHILS ABSOLUTE: 0.05 E9/L (ref 0–0.2)
BASOPHILS RELATIVE PERCENT: 0.6 % (ref 0–2)
EOSINOPHILS ABSOLUTE: 0.14 E9/L (ref 0.05–0.5)
EOSINOPHILS RELATIVE PERCENT: 1.7 % (ref 0–6)
HCT VFR BLD CALC: 46.2 % (ref 37–54)
HEMOGLOBIN: 14.6 G/DL (ref 12.5–16.5)
IMMATURE GRANULOCYTES #: 0.02 E9/L
IMMATURE GRANULOCYTES %: 0.2 % (ref 0–5)
LYMPHOCYTES ABSOLUTE: 1.7 E9/L (ref 1.5–4)
LYMPHOCYTES RELATIVE PERCENT: 20.3 % (ref 20–42)
MCH RBC QN AUTO: 30.5 PG (ref 26–35)
MCHC RBC AUTO-ENTMCNC: 31.6 % (ref 32–34.5)
MCV RBC AUTO: 96.7 FL (ref 80–99.9)
MONOCYTES ABSOLUTE: 0.49 E9/L (ref 0.1–0.95)
MONOCYTES RELATIVE PERCENT: 5.8 % (ref 2–12)
NEUTROPHILS ABSOLUTE: 5.98 E9/L (ref 1.8–7.3)
NEUTROPHILS RELATIVE PERCENT: 71.4 % (ref 43–80)
PDW BLD-RTO: 12.9 FL (ref 11.5–15)
PLATELET # BLD: 322 E9/L (ref 130–450)
PMV BLD AUTO: 10.4 FL (ref 7–12)
RBC # BLD: 4.78 E12/L (ref 3.8–5.8)
WBC # BLD: 8.4 E9/L (ref 4.5–11.5)

## 2022-08-08 PROCEDURE — 36415 COLL VENOUS BLD VENIPUNCTURE: CPT | Performed by: FAMILY MEDICINE

## 2022-08-08 PROCEDURE — 99214 OFFICE O/P EST MOD 30 MIN: CPT | Performed by: FAMILY MEDICINE

## 2022-08-08 PROCEDURE — 1123F ACP DISCUSS/DSCN MKR DOCD: CPT | Performed by: FAMILY MEDICINE

## 2022-08-08 RX ORDER — PHENYTOIN SODIUM 100 MG/1
CAPSULE, EXTENDED RELEASE ORAL
Qty: 360 CAPSULE | Refills: 1 | Status: SHIPPED | OUTPATIENT
Start: 2022-08-08

## 2022-08-08 RX ORDER — ASPIRIN 325 MG
325 TABLET ORAL DAILY
COMMUNITY

## 2022-08-08 RX ORDER — LEVOTHYROXINE SODIUM 0.1 MG/1
100 TABLET ORAL DAILY
Qty: 90 TABLET | Refills: 1 | Status: SHIPPED | OUTPATIENT
Start: 2022-08-08

## 2022-08-08 SDOH — ECONOMIC STABILITY: FOOD INSECURITY: WITHIN THE PAST 12 MONTHS, YOU WORRIED THAT YOUR FOOD WOULD RUN OUT BEFORE YOU GOT MONEY TO BUY MORE.: NEVER TRUE

## 2022-08-08 SDOH — ECONOMIC STABILITY: FOOD INSECURITY: WITHIN THE PAST 12 MONTHS, THE FOOD YOU BOUGHT JUST DIDN'T LAST AND YOU DIDN'T HAVE MONEY TO GET MORE.: NEVER TRUE

## 2022-08-08 ASSESSMENT — ENCOUNTER SYMPTOMS
ABDOMINAL DISTENTION: 0
ABDOMINAL PAIN: 0
RHINORRHEA: 0
SHORTNESS OF BREATH: 0
DIARRHEA: 0
NAUSEA: 0
BLOOD IN STOOL: 0
EYE DISCHARGE: 0
SINUS PRESSURE: 0
WHEEZING: 0
EYE PAIN: 0
FACIAL SWELLING: 0
COLOR CHANGE: 0
VOMITING: 0
PHOTOPHOBIA: 0
CONSTIPATION: 0
COUGH: 1
SORE THROAT: 0
EYE ITCHING: 0

## 2022-08-08 ASSESSMENT — PATIENT HEALTH QUESTIONNAIRE - PHQ9
SUM OF ALL RESPONSES TO PHQ QUESTIONS 1-9: 0
1. LITTLE INTEREST OR PLEASURE IN DOING THINGS: 0
2. FEELING DOWN, DEPRESSED OR HOPELESS: 0
SUM OF ALL RESPONSES TO PHQ QUESTIONS 1-9: 0
SUM OF ALL RESPONSES TO PHQ9 QUESTIONS 1 & 2: 0
SUM OF ALL RESPONSES TO PHQ QUESTIONS 1-9: 0
SUM OF ALL RESPONSES TO PHQ QUESTIONS 1-9: 0

## 2022-08-08 ASSESSMENT — SOCIAL DETERMINANTS OF HEALTH (SDOH): HOW HARD IS IT FOR YOU TO PAY FOR THE VERY BASICS LIKE FOOD, HOUSING, MEDICAL CARE, AND HEATING?: NOT HARD AT ALL

## 2022-08-08 NOTE — PROGRESS NOTES
Janny Cuevas (:  1940) is a 80 y.o. male,Established patient, here for evaluation of the following chief complaint(s):  Follow-up, Pain (Located back of head comes and goes. States only lasts a few minutes), and Shoulder Pain (Left shoulder decreased ROM )         ASSESSMENT/PLAN:  1. Hyperlipidemia, unspecified hyperlipidemia type  -     Comprehensive Metabolic Panel, Fasting; Future  -     Lipid Panel; Future  -     CBC with Auto Differential; Future  2. Seizures (HCC)  -     Comprehensive Metabolic Panel, Fasting; Future  -     CBC with Auto Differential; Future  3. Acquired hypothyroidism  -     Comprehensive Metabolic Panel, Fasting; Future  -     TSH; Future  -     CBC with Auto Differential; Future  4. Nodule of lower lobe of right lung  -     CT CHEST WO CONTRAST; Future  5. Bursitis of left shoulder  -     XR SHOULDER LEFT (MIN 2 VIEWS); Future  6. Nonintractable headache, unspecified chronicity pattern, unspecified headache type      PLAN:  Labs drawn. X-ray shoulder. Noncontrast CT scan of the chest for further evaluation of the lung nodule. Will obtain COVID 19 booster in the fall. Return in about 6 months (around 2023) for Follow up, Labs. Subjective   SUBJECTIVE/OBJECTIVE:  Patient here for routine follow-up and labs. Has not been seen in over a year. He complains of pain left shoulder as well as the posterior aspect of his scalp. Onset of symptoms began approximately 2 years ago when he fell off of the bed. He is accompanied by wife. He denies any seizure activity. He has been following with dermatology had part of his right ear removed surgically due to a skin cancer. I have no reports available. Sounds as though he may have been following with Dr. Marianne Houston every 6 months. Review of Systems   Constitutional:  Negative for chills, fatigue and fever. HENT:  Positive for congestion (After meals with sneezing) and hearing loss.  Negative for ear discharge, ear pain, facial swelling, nosebleeds, rhinorrhea, sinus pressure and sore throat. Eyes:  Negative for photophobia, pain, discharge, itching and visual disturbance. Respiratory:  Positive for cough. Negative for shortness of breath and wheezing. Cardiovascular:  Negative for chest pain, palpitations and leg swelling. Gastrointestinal:  Negative for abdominal distention, abdominal pain, blood in stool, constipation, diarrhea, nausea and vomiting. Endocrine: Negative for polydipsia, polyphagia and polyuria. Genitourinary:  Negative for difficulty urinating, dysuria, frequency, hematuria and urgency. Musculoskeletal:  Positive for arthralgias (L shoulder). Negative for joint swelling and myalgias. Skin:  Negative for color change and rash. Allergic/Immunologic: Negative for environmental allergies and food allergies. Neurological:  Negative for dizziness, seizures, syncope, weakness, numbness and headaches. Psychiatric/Behavioral:  Negative for confusion, hallucinations and suicidal ideas. The patient is not nervous/anxious. Objective   /70   Pulse 70   Temp 97.4 °F (36.3 °C) (Temporal)   Ht 5' 4\" (1.626 m)   Wt 187 lb (84.8 kg)   SpO2 97%   BMI 32.10 kg/m²   Current Outpatient Medications   Medication Sig Dispense Refill    aspirin 325 MG tablet Take 325 mg by mouth in the morning. levothyroxine (SYNTHROID) 100 MCG tablet Take 1 tablet by mouth in the morning. 90 tablet 1    phenytoin (DILANTIN) 100 MG ER capsule take 2 capsules by mouth twice a day 360 capsule 1    atorvastatin (LIPITOR) 20 MG tablet take 1 tablet by mouth once daily 90 tablet 0    Multiple Vitamins-Minerals (THERAPEUTIC MULTIVITAMIN-MINERALS) tablet Take 1 tablet by mouth daily. Omega-3 Fatty Acids (FISH OIL) 1000 MG CAPS Take 1,000 mg by mouth in the morning. No current facility-administered medications for this visit. Physical Exam  Vitals and nursing note reviewed.    Constitutional: Appearance: Normal appearance. HENT:      Head: Normocephalic and atraumatic. Right Ear: Tympanic membrane, ear canal and external ear normal. There is no impacted cerumen. Left Ear: Tympanic membrane, ear canal and external ear normal. There is no impacted cerumen. Ears:      Comments: Hearing impaired to normal conversation. Nose: Nose normal.      Mouth/Throat:      Mouth: Mucous membranes are moist.      Comments: Dentures in place. Eyes:      General:         Right eye: Discharge (ptosis) present. Extraocular Movements: Extraocular movements intact. Conjunctiva/sclera: Conjunctivae normal.      Pupils: Pupils are equal, round, and reactive to light. Neck:      Vascular: No carotid bruit. Cardiovascular:      Rate and Rhythm: Normal rate and regular rhythm. Pulses: Normal pulses. Heart sounds: Normal heart sounds. No murmur heard. Pulmonary:      Effort: No respiratory distress. Breath sounds: No wheezing, rhonchi or rales. Abdominal:      General: Bowel sounds are normal. There is no distension. Palpations: There is no mass. Tenderness: There is no abdominal tenderness. There is no guarding or rebound. Musculoskeletal:         General: No swelling, tenderness or deformity. Cervical back: Normal range of motion. Right lower leg: No edema. Left lower leg: No edema. Lymphadenopathy:      Cervical: No cervical adenopathy. Skin:     General: Skin is warm and dry. Neurological:      General: No focal deficit present. Mental Status: He is alert and oriented to person, place, and time. Cranial Nerves: No cranial nerve deficit. Psychiatric:         Mood and Affect: Mood normal.         Behavior: Behavior normal.         Thought Content:  Thought content normal.         Judgment: Judgment normal.          CBC  WBC   Date Value Ref Range Status   08/08/2022 8.4 4.5 - 11.5 E9/L Final     RBC   Date Value Ref Range Status 08/08/2022 4.78 3.80 - 5.80 E12/L Final     Hemoglobin   Date Value Ref Range Status   08/08/2022 14.6 12.5 - 16.5 g/dL Final     Hematocrit   Date Value Ref Range Status   08/08/2022 46.2 37.0 - 54.0 % Final     MCV   Date Value Ref Range Status   08/08/2022 96.7 80.0 - 99.9 fL Final     MCH   Date Value Ref Range Status   08/08/2022 30.5 26.0 - 35.0 pg Final     MCHC   Date Value Ref Range Status   08/08/2022 31.6 (L) 32.0 - 34.5 % Final     RDW   Date Value Ref Range Status   08/08/2022 12.9 11.5 - 15.0 fL Final     Platelets   Date Value Ref Range Status   08/08/2022 322 130 - 450 E9/L Final     MPV   Date Value Ref Range Status   08/08/2022 10.4 7.0 - 12.0 fL Final     Neutrophils %   Date Value Ref Range Status   08/08/2022 71.4 43.0 - 80.0 % Final     Immature Granulocytes #   Date Value Ref Range Status   08/08/2022 0.02 E9/L Final     Immature Granulocytes %   Date Value Ref Range Status   08/08/2022 0.2 0.0 - 5.0 % Final     Lymphocytes %   Date Value Ref Range Status   08/08/2022 20.3 20.0 - 42.0 % Final     Monocytes %   Date Value Ref Range Status   08/08/2022 5.8 2.0 - 12.0 % Final     Eosinophils %   Date Value Ref Range Status   08/08/2022 1.7 0.0 - 6.0 % Final     Basophils %   Date Value Ref Range Status   08/08/2022 0.6 0.0 - 2.0 % Final     Neutrophils Absolute   Date Value Ref Range Status   08/08/2022 5.98 1.80 - 7.30 E9/L Final     Lymphocytes Absolute   Date Value Ref Range Status   08/08/2022 1.70 1.50 - 4.00 E9/L Final     Monocytes Absolute   Date Value Ref Range Status   08/08/2022 0.49 0.10 - 0.95 E9/L Final     Eosinophils Absolute   Date Value Ref Range Status   08/08/2022 0.14 0.05 - 0.50 E9/L Final     Basophils Absolute   Date Value Ref Range Status   08/08/2022 0.05 0.00 - 0.20 E9/L Final       CMP  Sodium   Date Value Ref Range Status   08/08/2022 139 132 - 146 mmol/L Final     Potassium   Date Value Ref Range Status   08/08/2022 3.9 3.5 - 5.0 mmol/L Final     Potassium reflex Magnesium   Date Value Ref Range Status   06/11/2021 4.2 3.5 - 5.0 mmol/L Final     Chloride   Date Value Ref Range Status   08/08/2022 100 98 - 107 mmol/L Final     CO2   Date Value Ref Range Status   08/08/2022 23 22 - 29 mmol/L Final     Anion Gap   Date Value Ref Range Status   08/08/2022 16 7 - 16 mmol/L Final     Glucose   Date Value Ref Range Status   07/20/2021 87 74 - 99 mg/dL Final   01/10/2012 92 70 - 110 mg/dL Final     BUN   Date Value Ref Range Status   08/08/2022 13 6 - 23 mg/dL Final     Creatinine   Date Value Ref Range Status   08/08/2022 0.8 0.7 - 1.2 mg/dL Final     GFR Non-   Date Value Ref Range Status   08/08/2022 >60 >=60 mL/min/1.73 Final     Comment:     Chronic Kidney Disease: less than 60 ml/min/1.73 sq.m. Kidney Failure: less than 15 ml/min/1.73 sq.m. Results valid for patients 18 years and older.        GFR    Date Value Ref Range Status   08/08/2022 >60  Final     Calcium   Date Value Ref Range Status   08/08/2022 9.9 8.6 - 10.2 mg/dL Final     Total Protein   Date Value Ref Range Status   08/08/2022 8.1 6.4 - 8.3 g/dL Final     Albumin   Date Value Ref Range Status   08/08/2022 4.7 3.5 - 5.2 g/dL Final   01/10/2012 4.5 3.2 - 4.8 g/dL Final     Total Bilirubin   Date Value Ref Range Status   08/08/2022 0.6 0.0 - 1.2 mg/dL Final     Alkaline Phosphatase   Date Value Ref Range Status   08/08/2022 67 40 - 129 U/L Final     ALT   Date Value Ref Range Status   08/08/2022 16 0 - 40 U/L Final     AST   Date Value Ref Range Status   08/08/2022 24 0 - 39 U/L Final       TSH  Lab Results   Component Value Date    TSH 1.610 08/08/2022       A1C  Lab Results   Component Value Date    LABA1C 5.6 10/13/2018       LIPID  Lab Results   Component Value Date    CHOL 233 (H) 08/08/2022    TRIG 122 08/08/2022    HDL 68 08/08/2022    LDLCALC 141 (H) 08/08/2022    LABVLDL 24 08/08/2022        PSA  Lab Results   Component Value Date    PSA 2.31 08/10/2018    PSADIA 4.33 (H) 10/14/2014       No results found for this visit on 08/08/22. An electronic signature was used to authenticate this note.     --Colby Ferrera, DO

## 2022-08-09 ENCOUNTER — HOSPITAL ENCOUNTER (OUTPATIENT)
Age: 82
Discharge: HOME | End: 2022-08-11
Payer: MEDICARE

## 2022-08-09 ENCOUNTER — HOSPITAL ENCOUNTER (OUTPATIENT)
Dept: GENERAL RADIOLOGY | Age: 82
Discharge: HOME | End: 2022-08-11
Payer: MEDICARE

## 2022-08-09 DIAGNOSIS — M75.52 BURSITIS OF LEFT SHOULDER: ICD-10-CM

## 2022-08-09 LAB
ALBUMIN SERPL-MCNC: 4.7 G/DL (ref 3.5–5.2)
ALP BLD-CCNC: 67 U/L (ref 40–129)
ALT SERPL-CCNC: 16 U/L (ref 0–40)
ANION GAP SERPL CALCULATED.3IONS-SCNC: 16 MMOL/L (ref 7–16)
AST SERPL-CCNC: 24 U/L (ref 0–39)
BILIRUB SERPL-MCNC: 0.6 MG/DL (ref 0–1.2)
BUN BLDV-MCNC: 13 MG/DL (ref 6–23)
CALCIUM SERPL-MCNC: 9.9 MG/DL (ref 8.6–10.2)
CHLORIDE BLD-SCNC: 100 MMOL/L (ref 98–107)
CHOLESTEROL, TOTAL: 233 MG/DL (ref 0–199)
CO2: 23 MMOL/L (ref 22–29)
CREAT SERPL-MCNC: 0.8 MG/DL (ref 0.7–1.2)
GFR AFRICAN AMERICAN: >60
GFR NON-AFRICAN AMERICAN: >60 ML/MIN/1.73
GLUCOSE FASTING: 108 MG/DL (ref 74–99)
HDLC SERPL-MCNC: 68 MG/DL
LDL CHOLESTEROL CALCULATED: 141 MG/DL (ref 0–99)
POTASSIUM SERPL-SCNC: 3.9 MMOL/L (ref 3.5–5)
SODIUM BLD-SCNC: 139 MMOL/L (ref 132–146)
TOTAL PROTEIN: 8.1 G/DL (ref 6.4–8.3)
TRIGL SERPL-MCNC: 122 MG/DL (ref 0–149)
TSH SERPL DL<=0.05 MIU/L-ACNC: 1.61 UIU/ML (ref 0.27–4.2)
VLDLC SERPL CALC-MCNC: 24 MG/DL

## 2022-08-09 PROCEDURE — 73030 X-RAY EXAM OF SHOULDER: CPT

## 2022-08-18 ENCOUNTER — HOSPITAL ENCOUNTER (OUTPATIENT)
Dept: CT IMAGING | Age: 82
Discharge: HOME | End: 2022-08-18
Payer: MEDICARE

## 2022-08-18 DIAGNOSIS — R91.1 NODULE OF LOWER LOBE OF RIGHT LUNG: ICD-10-CM

## 2022-08-18 PROCEDURE — 71250 CT THORAX DX C-: CPT

## 2022-11-01 RX ORDER — ATORVASTATIN CALCIUM 20 MG/1
TABLET, FILM COATED ORAL
Qty: 90 TABLET | Refills: 1 | Status: SHIPPED | OUTPATIENT
Start: 2022-11-01

## 2023-02-13 ENCOUNTER — OFFICE VISIT (OUTPATIENT)
Dept: PRIMARY CARE CLINIC | Age: 83
End: 2023-02-13
Payer: MEDICARE

## 2023-02-13 ENCOUNTER — HOSPITAL ENCOUNTER (OUTPATIENT)
Age: 83
Discharge: HOME | End: 2023-02-15
Payer: MEDICARE

## 2023-02-13 ENCOUNTER — HOSPITAL ENCOUNTER (OUTPATIENT)
Dept: GENERAL RADIOLOGY | Age: 83
Discharge: HOME | End: 2023-02-15
Payer: MEDICARE

## 2023-02-13 VITALS
BODY MASS INDEX: 30.56 KG/M2 | WEIGHT: 179 LBS | TEMPERATURE: 97.9 F | OXYGEN SATURATION: 98 % | SYSTOLIC BLOOD PRESSURE: 128 MMHG | DIASTOLIC BLOOD PRESSURE: 82 MMHG | HEART RATE: 64 BPM | HEIGHT: 64 IN

## 2023-02-13 DIAGNOSIS — M75.51 BURSITIS OF RIGHT SHOULDER: ICD-10-CM

## 2023-02-13 DIAGNOSIS — R56.9 SEIZURES (HCC): ICD-10-CM

## 2023-02-13 DIAGNOSIS — M12.812 ROTATOR CUFF TEAR ARTHROPATHY OF BOTH SHOULDERS: ICD-10-CM

## 2023-02-13 DIAGNOSIS — R29.6 FREQUENT FALLS: ICD-10-CM

## 2023-02-13 DIAGNOSIS — M12.811 ROTATOR CUFF TEAR ARTHROPATHY OF BOTH SHOULDERS: ICD-10-CM

## 2023-02-13 DIAGNOSIS — M75.101 ROTATOR CUFF TEAR ARTHROPATHY OF BOTH SHOULDERS: ICD-10-CM

## 2023-02-13 DIAGNOSIS — E78.5 HYPERLIPIDEMIA, UNSPECIFIED HYPERLIPIDEMIA TYPE: Primary | ICD-10-CM

## 2023-02-13 DIAGNOSIS — M75.102 ROTATOR CUFF TEAR ARTHROPATHY OF BOTH SHOULDERS: ICD-10-CM

## 2023-02-13 DIAGNOSIS — E03.9 ACQUIRED HYPOTHYROIDISM: ICD-10-CM

## 2023-02-13 PROCEDURE — 99214 OFFICE O/P EST MOD 30 MIN: CPT | Performed by: FAMILY MEDICINE

## 2023-02-13 PROCEDURE — 73030 X-RAY EXAM OF SHOULDER: CPT

## 2023-02-13 PROCEDURE — 1123F ACP DISCUSS/DSCN MKR DOCD: CPT | Performed by: FAMILY MEDICINE

## 2023-02-13 RX ORDER — PHENYTOIN SODIUM 100 MG/1
CAPSULE, EXTENDED RELEASE ORAL
Qty: 360 CAPSULE | Refills: 1 | Status: SHIPPED | OUTPATIENT
Start: 2023-02-13

## 2023-02-13 SDOH — ECONOMIC STABILITY: FOOD INSECURITY: WITHIN THE PAST 12 MONTHS, THE FOOD YOU BOUGHT JUST DIDN'T LAST AND YOU DIDN'T HAVE MONEY TO GET MORE.: NEVER TRUE

## 2023-02-13 SDOH — ECONOMIC STABILITY: HOUSING INSECURITY
IN THE LAST 12 MONTHS, WAS THERE A TIME WHEN YOU DID NOT HAVE A STEADY PLACE TO SLEEP OR SLEPT IN A SHELTER (INCLUDING NOW)?: NO

## 2023-02-13 SDOH — ECONOMIC STABILITY: FOOD INSECURITY: WITHIN THE PAST 12 MONTHS, YOU WORRIED THAT YOUR FOOD WOULD RUN OUT BEFORE YOU GOT MONEY TO BUY MORE.: NEVER TRUE

## 2023-02-13 SDOH — ECONOMIC STABILITY: INCOME INSECURITY: HOW HARD IS IT FOR YOU TO PAY FOR THE VERY BASICS LIKE FOOD, HOUSING, MEDICAL CARE, AND HEATING?: NOT HARD AT ALL

## 2023-02-13 ASSESSMENT — ENCOUNTER SYMPTOMS
FACIAL SWELLING: 0
NAUSEA: 0
CONSTIPATION: 0
PHOTOPHOBIA: 0
EYE DISCHARGE: 0
WHEEZING: 0
SORE THROAT: 0
BLOOD IN STOOL: 0
DIARRHEA: 0
COLOR CHANGE: 0
COUGH: 0
VOMITING: 0
EYE PAIN: 0
EYE ITCHING: 0
SINUS PRESSURE: 0
ABDOMINAL PAIN: 0
ABDOMINAL DISTENTION: 0
SHORTNESS OF BREATH: 0
RHINORRHEA: 0

## 2023-02-13 ASSESSMENT — PATIENT HEALTH QUESTIONNAIRE - PHQ9
SUM OF ALL RESPONSES TO PHQ9 QUESTIONS 1 & 2: 0
SUM OF ALL RESPONSES TO PHQ QUESTIONS 1-9: 0
1. LITTLE INTEREST OR PLEASURE IN DOING THINGS: 0
2. FEELING DOWN, DEPRESSED OR HOPELESS: 0
SUM OF ALL RESPONSES TO PHQ QUESTIONS 1-9: 0

## 2023-02-13 NOTE — PROGRESS NOTES
Caro Rojas (:  1940) is a 80 y.o. male,Established patient, here for evaluation of the following chief complaint(s):  6 Month Follow-Up (Labs) and Fatigue         ASSESSMENT/PLAN:  1. Hyperlipidemia, unspecified hyperlipidemia type  -     CBC; Future  -     Comprehensive Metabolic Panel, Fasting; Future  -     Lipid Panel; Future  2. Acquired hypothyroidism  -     CBC; Future  -     Comprehensive Metabolic Panel, Fasting; Future  -     TSH; Future  3. Seizures ([address]  -     CBC; Future  -     Comprehensive Metabolic Panel, Fasting; Future  -     Phenytoin Level, Total; Future  -     phenytoin (DILANTIN) 100 MG ER capsule; take 2 capsules by mouth twice a day, Disp-360 capsule, R-1Normal  -     CT HEAD WO CONTRAST; Future  4. Bursitis of right shoulder  -     XR SHOULDER RIGHT (MIN 2 VIEWS); Future  5. Frequent falls  -     CT HEAD WO CONTRAST; Future  6. Rotator cuff tear arthropathy of both shoulders      PLAN:  Labs drawn. X-ray R shoulder. CT scan of the brain ordered. Noncontrast CT scan of the chest reviewed   Immunization records reviewed. Consider formal physical therapy for bilateral shoulders. Consider MRI of the shoulders with possible orthopedic referral.    Return in about 6 months (around 2023) for Follow up, Labs. Subjective   SUBJECTIVE/OBJECTIVE:  Patient here for routine follow-up and labs. Now complains of pain in his right shoulder as well as his left. Difficulty raising his arms overhead. He denies any definite seizure activity. He denies vertigo. Frequent falls when he turns too quickly to the right without apparent injury although he did state he had a bruise on his head after the initial fall several months ago. Review of Systems   Constitutional:  Negative for chills, fatigue and fever. HENT:  Positive for hearing loss. Negative for congestion, ear discharge, ear pain, facial swelling, nosebleeds, rhinorrhea, sinus pressure and sore throat.     Eyes: Negative for photophobia, pain, discharge, itching and visual disturbance. Respiratory:  Negative for cough, shortness of breath and wheezing. Cardiovascular:  Negative for chest pain, palpitations and leg swelling. Gastrointestinal:  Negative for abdominal distention, abdominal pain, blood in stool, constipation, diarrhea, nausea and vomiting. Endocrine: Negative for polydipsia, polyphagia and polyuria. Genitourinary:  Negative for difficulty urinating, dysuria, frequency, hematuria and urgency. Musculoskeletal:  Positive for arthralgias (Bilateral shoulders). Negative for joint swelling and myalgias. Skin:  Negative for color change and rash. Allergic/Immunologic: Negative for environmental allergies and food allergies. Neurological:  Positive for seizures. Negative for dizziness, syncope, weakness, numbness and headaches. Frequent falls   Psychiatric/Behavioral:  Negative for confusion, hallucinations and suicidal ideas. The patient is not nervous/anxious. Objective   /82 (Position: Sitting)   Pulse 64   Temp 97.9 °F (36.6 °C) (Temporal)   Ht 5' 4\" (1.626 m)   Wt 179 lb (81.2 kg)   SpO2 98%   BMI 30.73 kg/m²   Current Outpatient Medications   Medication Sig Dispense Refill    phenytoin (DILANTIN) 100 MG ER capsule take 2 capsules by mouth twice a day 360 capsule 1    atorvastatin (LIPITOR) 20 MG tablet take 1 tablet by mouth once daily 90 tablet 1    aspirin 325 MG tablet Take 325 mg by mouth in the morning. levothyroxine (SYNTHROID) 100 MCG tablet Take 1 tablet by mouth in the morning. 90 tablet 1    Multiple Vitamins-Minerals (THERAPEUTIC MULTIVITAMIN-MINERALS) tablet Take 1 tablet by mouth daily. Omega-3 Fatty Acids (FISH OIL) 1000 MG CAPS Take 1,000 mg by mouth in the morning. No current facility-administered medications for this visit. Physical Exam  Vitals and nursing note reviewed. Constitutional:       Appearance: Normal appearance. HENT:      Head: Normocephalic and atraumatic. Right Ear: Tympanic membrane, ear canal and external ear normal. There is no impacted cerumen. Left Ear: Tympanic membrane, ear canal and external ear normal. There is no impacted cerumen. Ears:      Comments: Hearing impaired to normal conversation. Nose: Nose normal.      Mouth/Throat:      Mouth: Mucous membranes are moist.      Comments: Dentures in place. Eyes:      General: No scleral icterus. Extraocular Movements: Extraocular movements intact. Conjunctiva/sclera: Conjunctivae normal.      Pupils: Pupils are equal, round, and reactive to light. Comments: Ptosis of the right eye noted   Neck:      Vascular: No carotid bruit. Cardiovascular:      Rate and Rhythm: Normal rate and regular rhythm. Pulses: Normal pulses. Heart sounds: Normal heart sounds. No murmur heard. Pulmonary:      Effort: Pulmonary effort is normal. No respiratory distress. Breath sounds: Normal breath sounds. No wheezing, rhonchi or rales. Musculoskeletal:         General: No swelling, tenderness or deformity. Right shoulder: No deformity or crepitus. Decreased range of motion. Normal strength. Left shoulder: No deformity or crepitus. Decreased range of motion. Normal strength. Right lower leg: No edema. Left lower leg: No edema. Comments: Active shoulder ROM forward elevation and abduction limited. Passive ROM minimally restricted. Drop arm test positive bilaterally. Mccann and Neer test are negative. Lymphadenopathy:      Cervical: No cervical adenopathy. Skin:     General: Skin is warm and dry. Neurological:      General: No focal deficit present. Mental Status: He is alert and oriented to person, place, and time. Cranial Nerves: No cranial nerve deficit. Psychiatric:         Mood and Affect: Mood normal.         Behavior: Behavior normal.         Thought Content:  Thought content normal. Judgment: Judgment normal.          CBC  WBC   Date Value Ref Range Status   08/08/2022 8.4 4.5 - 11.5 E9/L Final     RBC   Date Value Ref Range Status   08/08/2022 4.78 3.80 - 5.80 E12/L Final     Hemoglobin   Date Value Ref Range Status   08/08/2022 14.6 12.5 - 16.5 g/dL Final     Hematocrit   Date Value Ref Range Status   08/08/2022 46.2 37.0 - 54.0 % Final     MCV   Date Value Ref Range Status   08/08/2022 96.7 80.0 - 99.9 fL Final     MCH   Date Value Ref Range Status   08/08/2022 30.5 26.0 - 35.0 pg Final     MCHC   Date Value Ref Range Status   08/08/2022 31.6 (L) 32.0 - 34.5 % Final     RDW   Date Value Ref Range Status   08/08/2022 12.9 11.5 - 15.0 fL Final     Platelets   Date Value Ref Range Status   08/08/2022 322 130 - 450 E9/L Final     MPV   Date Value Ref Range Status   08/08/2022 10.4 7.0 - 12.0 fL Final     Neutrophils %   Date Value Ref Range Status   08/08/2022 71.4 43.0 - 80.0 % Final     Immature Granulocytes #   Date Value Ref Range Status   08/08/2022 0.02 E9/L Final     Immature Granulocytes %   Date Value Ref Range Status   08/08/2022 0.2 0.0 - 5.0 % Final     Lymphocytes %   Date Value Ref Range Status   08/08/2022 20.3 20.0 - 42.0 % Final     Monocytes %   Date Value Ref Range Status   08/08/2022 5.8 2.0 - 12.0 % Final     Eosinophils %   Date Value Ref Range Status   08/08/2022 1.7 0.0 - 6.0 % Final     Basophils %   Date Value Ref Range Status   08/08/2022 0.6 0.0 - 2.0 % Final     Neutrophils Absolute   Date Value Ref Range Status   08/08/2022 5.98 1.80 - 7.30 E9/L Final     Lymphocytes Absolute   Date Value Ref Range Status   08/08/2022 1.70 1.50 - 4.00 E9/L Final     Monocytes Absolute   Date Value Ref Range Status   08/08/2022 0.49 0.10 - 0.95 E9/L Final     Eosinophils Absolute   Date Value Ref Range Status   08/08/2022 0.14 0.05 - 0.50 E9/L Final     Basophils Absolute   Date Value Ref Range Status   08/08/2022 0.05 0.00 - 0.20 E9/L Final       CMP  Sodium   Date Value Ref Range Status   08/08/2022 139 132 - 146 mmol/L Final     Potassium   Date Value Ref Range Status   08/08/2022 3.9 3.5 - 5.0 mmol/L Final     Potassium reflex Magnesium   Date Value Ref Range Status   06/11/2021 4.2 3.5 - 5.0 mmol/L Final     Chloride   Date Value Ref Range Status   08/08/2022 100 98 - 107 mmol/L Final     CO2   Date Value Ref Range Status   08/08/2022 23 22 - 29 mmol/L Final     Anion Gap   Date Value Ref Range Status   08/08/2022 16 7 - 16 mmol/L Final     Glucose   Date Value Ref Range Status   07/20/2021 87 74 - 99 mg/dL Final   01/10/2012 92 70 - 110 mg/dL Final     BUN   Date Value Ref Range Status   08/08/2022 13 6 - 23 mg/dL Final     Creatinine   Date Value Ref Range Status   08/08/2022 0.8 0.7 - 1.2 mg/dL Final     GFR Non-   Date Value Ref Range Status   08/08/2022 >60 >=60 mL/min/1.73 Final     Comment:     Chronic Kidney Disease: less than 60 ml/min/1.73 sq.m. Kidney Failure: less than 15 ml/min/1.73 sq.m. Results valid for patients 18 years and older.        GFR    Date Value Ref Range Status   08/08/2022 >60  Final     Calcium   Date Value Ref Range Status   08/08/2022 9.9 8.6 - 10.2 mg/dL Final     Total Protein   Date Value Ref Range Status   08/08/2022 8.1 6.4 - 8.3 g/dL Final     Albumin   Date Value Ref Range Status   08/08/2022 4.7 3.5 - 5.2 g/dL Final   01/10/2012 4.5 3.2 - 4.8 g/dL Final     Total Bilirubin   Date Value Ref Range Status   08/08/2022 0.6 0.0 - 1.2 mg/dL Final     Alkaline Phosphatase   Date Value Ref Range Status   08/08/2022 67 40 - 129 U/L Final     ALT   Date Value Ref Range Status   08/08/2022 16 0 - 40 U/L Final     AST   Date Value Ref Range Status   08/08/2022 24 0 - 39 U/L Final       TSH  Lab Results   Component Value Date    TSH 1.610 08/08/2022       A1C  Lab Results   Component Value Date    LABA1C 5.6 10/13/2018       LIPID  Lab Results   Component Value Date    CHOL 233 (H) 08/08/2022    TRIG 122 08/08/2022 HDL 68 08/08/2022    LDLCALC 141 (H) 08/08/2022    LABVLDL 24 08/08/2022        PSA  Lab Results   Component Value Date    PSA 2.31 08/10/2018    PSADIA 4.33 (H) 10/14/2014       No results found for this visit on 02/13/23. An electronic signature was used to authenticate this note.     --Amelia Nicholas, DO

## 2023-03-15 ENCOUNTER — HOSPITAL ENCOUNTER (OUTPATIENT)
Dept: CT IMAGING | Age: 83
Discharge: HOME | End: 2023-03-15
Payer: MEDICARE

## 2023-03-15 DIAGNOSIS — R56.9 SEIZURES (HCC): ICD-10-CM

## 2023-03-15 DIAGNOSIS — R29.6 FREQUENT FALLS: ICD-10-CM

## 2023-03-15 PROCEDURE — 70450 CT HEAD/BRAIN W/O DYE: CPT

## 2023-05-01 RX ORDER — ATORVASTATIN CALCIUM 20 MG/1
TABLET, FILM COATED ORAL
Qty: 90 TABLET | Refills: 1 | Status: SHIPPED | OUTPATIENT
Start: 2023-05-01

## 2023-05-15 RX ORDER — LEVOTHYROXINE SODIUM 0.1 MG/1
TABLET ORAL
Qty: 90 TABLET | Refills: 1 | Status: SHIPPED | OUTPATIENT
Start: 2023-05-15

## 2023-05-31 ENCOUNTER — OFFICE VISIT (OUTPATIENT)
Dept: PRIMARY CARE CLINIC | Age: 83
End: 2023-05-31
Payer: MEDICARE

## 2023-05-31 VITALS
TEMPERATURE: 98.1 F | HEIGHT: 64 IN | DIASTOLIC BLOOD PRESSURE: 60 MMHG | OXYGEN SATURATION: 98 % | WEIGHT: 174.8 LBS | HEART RATE: 62 BPM | SYSTOLIC BLOOD PRESSURE: 130 MMHG | BODY MASS INDEX: 29.84 KG/M2

## 2023-05-31 DIAGNOSIS — R29.6 FREQUENT FALLS: Primary | ICD-10-CM

## 2023-05-31 DIAGNOSIS — H91.93 DECREASED HEARING OF BOTH EARS: ICD-10-CM

## 2023-05-31 PROCEDURE — 1123F ACP DISCUSS/DSCN MKR DOCD: CPT

## 2023-05-31 PROCEDURE — 99213 OFFICE O/P EST LOW 20 MIN: CPT

## 2023-05-31 NOTE — PATIENT INSTRUCTIONS
[facility]   [address] [place] [address] Drive [facility]   Phone number: [phone] [facility]  [address]  [address] [facility]  Ph: [phone]  Fax: [phone]

## 2023-05-31 NOTE — PROGRESS NOTES
mmol/L    Glucose, Fasting 90 74 - 99 mg/dL    BUN 14 6 - 23 mg/dL    Creatinine 0.7 0.7 - 1.2 mg/dL    Est, Glom Filt Rate >60 >=60 mL/min/1.73    Calcium 9.3 8.6 - 10.2 mg/dL    Total Protein 7.1 6.4 - 8.3 g/dL    Albumin 4.3 3.5 - 5.2 g/dL    Total Bilirubin 0.6 0.0 - 1.2 mg/dL    Alkaline Phosphatase 77 40 - 129 U/L    ALT 22 0 - 40 U/L    AST 22 0 - 39 U/L   CBC   Result Value Ref Range    WBC 7.4 4.5 - 11.5 E9/L    RBC 4.64 3.80 - 5.80 E12/L    Hemoglobin 15.5 12.5 - 16.5 g/dL    Hematocrit 46.5 37.0 - 54.0 %    .2 (H) 80.0 - 99.9 fL    MCH 33.4 26.0 - 35.0 pg    MCHC 33.3 32.0 - 34.5 %    RDW 12.9 11.5 - 15.0 fL    Platelets 579 131 - 710 E9/L    MPV 12.1 (H) 7.0 - 12.0 fL         Assessment and Plan:  Myles Padilla was seen today for fall. Diagnoses and all orders for this visit:    Frequent falls  -     [facility], [facility]    Decreased hearing of both [address] [place] [address] Audiology        Wife concerned with hearing loss. Could be contributory to feeling off balance. Will have patient evaluated by audiology. May need to follow-up with ENT. Patient also observed ambulating with cane. Feel he would benefit from physical therapy evaluation. Patient and wife were agreeable to this. Denies hitting head or losing consciousness. Denies any sensation of vertigo. Encouraged to contact office if worsening. Has a PCP visit on 8/22/2023. Advised to come in sooner if needed. Supplied patient and wife with contact information for above referrals. No follow-ups on file. Patient may come in sooner if needed for medical concerns. Patient advised to call at any time to cancel, re-schedule, or for any questions/concerns.     Blossom Brandon PA-C    5/31/23  10:07 AM

## 2023-06-02 ASSESSMENT — ENCOUNTER SYMPTOMS
DIARRHEA: 0
PHOTOPHOBIA: 0
CONSTIPATION: 0
SORE THROAT: 0
SHORTNESS OF BREATH: 0
COUGH: 0
VOMITING: 0
ABDOMINAL PAIN: 0
WHEEZING: 0
RHINORRHEA: 0
BACK PAIN: 0

## 2023-06-05 ENCOUNTER — HOSPITAL ENCOUNTER (OUTPATIENT)
Dept: PHYSICAL THERAPY | Age: 83
Setting detail: THERAPIES SERIES
Discharge: HOME | End: 2023-06-05
Payer: MEDICARE

## 2023-06-05 PROCEDURE — 97162 PT EVAL MOD COMPLEX 30 MIN: CPT | Performed by: PHYSICAL THERAPIST

## 2023-06-05 PROCEDURE — 97110 THERAPEUTIC EXERCISES: CPT | Performed by: PHYSICAL THERAPIST

## 2023-06-05 NOTE — PROGRESS NOTES
[facility]  Physical Therapy Daily Treatment Note    Date: 2023  Patient Name: Patricia Luque  : 1940   MRN: [id number]  [facility]: na  DOSx: Alyson Elise  Referring Provider: Daniel Gaston PA-C  [address] [place] [address]     Medical Diagnosis: Frequent falls (R29.6    Outcome Measure: KENNEDY BALANCE TEST= 34 medium fall risk    S: See eval  O:  Time 0812-4663     Visit      Pain 0/10     ROM      Modalities            THEREX      Nustep        Marching      Alt. Sidekicks      HS curls                        THERAPEUTIC ACTIVITIES Large, functional, dynamic, global movements used to build strength, balance, endurance, and flexibility and to improve physical performance. Step ups      Lt step ups      Calf Raises      Squats      Sit/Stands                   NMR  Balance activities to aid in safe community and home ambulation Feedback and cues necessary for developing neuromuscular control. Movement education and guided movement interventions such as   used to improve performance and control. Marching Gait      Sidestepping      Retrowalk      Heel to toe      March on BOSU      Up and over step      Fwd with cones      A:  Tolerated well. Pt given written HEP.   P: Continue with rehab plan  Brenda Murray PT    Treatment Charges: Mins Units   Initial Evaluation 32 1   Re-Evaluation     Ther Exercise         TE 8 1   Manual Therapy     MT     Ther Activities        TA     Gait Training          GT     Neuro Re-education NR     Modalities     Non-Billable Service Time     Other     Total Time/Units 40 2
4mg/ml  with iontophoresis treatments. Patient is not allergic. Suggested Professional Referral: [x] No  [] Yes:     The following CPT codes are likely to be used in the care of this patient: 98333 PT Evaluation: Moderate Complexity   31394 Therapeutic Exercise   54875 Neuromuscular Re-Education   55166 Therapeutic Activities     Patient Education:  [x] Plans/Goals, Risks/Benefits discussed  [x] Home exercise program  Method of Education: [x] Verbal  [x] Demo  [x] Written  Comprehension of Education:  [x] Verbalizes understanding. [x] Demonstrates understanding. [] Needs Review. [] Demonstrates/verbalizes understanding of HEP/Ed previously given. Frequency:   2 times per week for 6 weeks    Patient understands diagnosis/prognosis and consents to treatment, plan and goals: [x] Yes    [] No     Thank you for the opportunity to work with your patient. If you have questions or comments, please contact me at [phone]; fax: [phone]. Electronically signed by: Trinh Serna, PT         Please sign Physician's Certification and return to:   [address] [place] [address], [address] [place] [id number]  Dept: [phone]  Dept Fax: [phone] Certification / Comments     Frequency/Duration  2  times per week for  6 weeks. Certification period From: 6/5/2023  To: 9/05/2023    I have reviewed the Plan of Care established for skilled therapy services and certify that the services are required and that they will be provided while the patient is under my care.     Physician's Comments/Revisions:               Physician's Printed Name:                                           [de-identified] Signature:                                                               Date:

## 2023-06-08 ENCOUNTER — HOSPITAL ENCOUNTER (OUTPATIENT)
Dept: PHYSICAL THERAPY | Age: 83
Setting detail: THERAPIES SERIES
Discharge: HOME | End: 2023-06-08
Payer: MEDICARE

## 2023-06-08 PROCEDURE — 97530 THERAPEUTIC ACTIVITIES: CPT

## 2023-06-08 PROCEDURE — 97110 THERAPEUTIC EXERCISES: CPT

## 2023-06-08 NOTE — PROGRESS NOTES
[facility]  Physical Therapy Daily Treatment Note  Date: 2023  Patient Name: Juan Carlos Brown  : 1940   MRN: [id number]  [facility]: na  DOSx: Tony Baptiste  Referring Provider: Donna Encarnacion PA-C  [address] [facility] [address]     Medical Diagnosis: Frequent falls (R29.6    Outcome Measure: KENNEDY BALANCE TEST= 34 medium fall risk    S: Patient reports independence HEP. O: Loss of hearing left ear. Time 2359-2032    Visit     Pain 0/10    ROM     THEREX     Nustep   L5 x 5 min, seat #8    Marching 1# x 1.5 min    Alt. Sidekicks 1# x 1.5 min    Alt. HS curls 1# x 1.5 min                   THERAPEUTIC ACTIVITIES Large, functional, dynamic, global movements used to build strength, balance, endurance, and flexibility and to improve physical performance. Step ups 6\" x 1.5 min each    Lt step ups 6\" up & over x 2 min    Calf Raises 1# x 1.5 min    Squats X 20 to chair    Sit/Stands                NMR Balance activities to aid in safe community and home ambulation Feedback and cues necessary for developing neuromuscular control. Movement education and guided movement interventions such as   used to improve performance and control. Marching Gait     Sidestepping     Retrowalk     Heel to toe     March on BOSU     Fwd with cones     A:  Tolerated well. P: Continue with rehab plan.   Bella Mccann PTA    Treatment Charges: Mins Units   Initial Evaluation     Re-Evaluation     Ther Exercise         TE 23 2   Manual Therapy     MT     Ther Activities        TA 16 1   Gait Training          GT     Neuro Re-education NR     Modalities     Non-Billable Service Time     Other     Total Time/Units 39 3

## 2023-06-20 ENCOUNTER — HOSPITAL ENCOUNTER (OUTPATIENT)
Dept: PHYSICAL THERAPY | Age: 83
Setting detail: THERAPIES SERIES
Discharge: HOME | End: 2023-06-20
Payer: MEDICARE

## 2023-06-20 PROCEDURE — 97530 THERAPEUTIC ACTIVITIES: CPT

## 2023-06-20 PROCEDURE — 97110 THERAPEUTIC EXERCISES: CPT

## 2023-06-20 NOTE — PROGRESS NOTES
[facility]  Physical Therapy Daily Treatment Note  Date: 2023  Patient Name: Kulwant Siegel  :    MRN: [id number]  [facility]: na  DOSx: Estephania Rabago  Referring Provider: Osiel Martinez PA-C  [address] [facility]     Medical Diagnosis: Frequent falls (R29.6    Outcome Measure: KENNEDY BALANCE TEST= 34 medium fall risk  *Loss of hearing left ear. S: Patient reports feeling fine after last session. His wife reports he fell yesterday carrying groceries, lost his balance falling forward. He was not injured. O:   Time 5467-8643    Visit     Pain 0/10    ROM     THEREX     Nustep   L5 x 6 min, seat #8    Marching 2# x 2 min    Alt. Sidekicks 2# x 2 min    Alt. HS curls 2# x 2 min         THERAPEUTIC ACTIVITIES Large, functional, dynamic, global movements used to build strength, balance, endurance, and flexibility and to improve physical performance. Step ups 6\" x 2 min each    Lt step ups 6\" up & over x 2 min    Calf Raises 2# x 1.5 min    Squats X 25 to chair         NMR Balance activities to aid in safe community and home ambulation Feedback and cues necessary for developing neuromuscular control. Movement education and guided movement interventions such as   used to improve performance and control. Marching Gait 2 x 20' +1 CG    Sidestepping 2 x 20' +1 CG, patient had LOB with side stepping to right needing assistance to correct. Retrowalk     Heel to toe     Fwd with cones     A:  Tolerated well. LOB with side stepping to right needing assistance to correct. P: Continue with rehab plan.   Harish Li, PTA    Treatment Charges: Mins Units   Initial Evaluation     Re-Evaluation     Ther Exercise         TE 25 2   Manual Therapy     MT     Ther Activities        TA 14 1   Gait Training          GT     Neuro Re-education NR 5 0   Modalities     Non-Billable Service Time     Other     Total Time/Units  44 3

## 2023-06-22 ENCOUNTER — HOSPITAL ENCOUNTER (OUTPATIENT)
Dept: PHYSICAL THERAPY | Age: 83
Setting detail: THERAPIES SERIES
Discharge: HOME | End: 2023-06-22
Payer: MEDICARE

## 2023-06-22 PROCEDURE — 97110 THERAPEUTIC EXERCISES: CPT

## 2023-06-22 PROCEDURE — 97530 THERAPEUTIC ACTIVITIES: CPT

## 2023-06-22 NOTE — PROGRESS NOTES
[facility]  Physical Therapy Daily Treatment Note  Date: 2023  Patient Name: Juan Mcnally  : [birth year]   MRN: [id number]  [facility]: na  DOSx: Gabriela Co  Referring Provider: Nela Taylor PA-C  [address] [place], [place]     Medical Diagnosis: Frequent falls (R29.6    Outcome Measure: KENNEDY BALANCE TEST= 34 medium fall risk  *Loss of hearing left ear. S: Patient reports feeling fine after last session. O: Added alt lunges, increased reps NMR exercises. Time 6774-7565    Visit     Pain 0/10    ROM     THEREX     Nustep   L5 x 6 min, seat #8    Marching 2# x 2 min    Alt. Sidekicks 2# x 2 min    Alt. HS curls 2# x 2 min         THERAPEUTIC ACTIVITIES Large, functional, dynamic, global movements used to build strength, balance, endurance, and flexibility and to improve physical performance. Step ups 6\" x 2 min each    Lt step ups 6\" up & over x 2 min    Calf Raises 2# x 1.5 min    Squats X 25 to chair    Alt. lunges 2# x 2 min    NMR Balance activities to aid in safe community and home ambulation Feedback and cues necessary for developing neuromuscular control. Movement education and guided movement interventions such as   used to improve performance and control. Marching Gait 4 x 20' +1 CG    Sidestepping 4 x 20' +1 CG    Retrowalk     Heel to toe     Fwd with cones     A:  Tolerated well. No LOB with NMR today. P: Continue with rehab plan.   Pete Littlejohn PTA    Treatment Charges: Mins Units   Initial Evaluation     Re-Evaluation     Ther Exercise         TE 25 2   Manual Therapy     MT     Ther Activities        TA 16 1   Gait Training          GT     Neuro Re-education NR 9 0   Modalities     Non-Billable Service Time     Other     Total Time/Units  50 3

## 2023-06-27 ENCOUNTER — HOSPITAL ENCOUNTER (OUTPATIENT)
Dept: PHYSICAL THERAPY | Age: 83
Setting detail: THERAPIES SERIES
Discharge: HOME | End: 2023-06-27
Payer: MEDICARE

## 2023-06-27 PROCEDURE — 97530 THERAPEUTIC ACTIVITIES: CPT | Performed by: PHYSICAL THERAPIST

## 2023-06-27 PROCEDURE — 97110 THERAPEUTIC EXERCISES: CPT | Performed by: PHYSICAL THERAPIST

## 2023-06-27 PROCEDURE — 97112 NEUROMUSCULAR REEDUCATION: CPT | Performed by: PHYSICAL THERAPIST

## 2023-06-30 ENCOUNTER — HOSPITAL ENCOUNTER (OUTPATIENT)
Dept: PHYSICAL THERAPY | Age: 83
Setting detail: THERAPIES SERIES
Discharge: HOME | End: 2023-06-30
Payer: MEDICARE

## 2023-06-30 PROCEDURE — 97530 THERAPEUTIC ACTIVITIES: CPT

## 2023-06-30 PROCEDURE — 97110 THERAPEUTIC EXERCISES: CPT

## 2023-06-30 PROCEDURE — 97112 NEUROMUSCULAR REEDUCATION: CPT

## 2023-07-05 ENCOUNTER — HOSPITAL ENCOUNTER (OUTPATIENT)
Dept: PHYSICAL THERAPY | Age: 83
Setting detail: THERAPIES SERIES
Discharge: HOME | End: 2023-07-05
Payer: MEDICARE

## 2023-07-05 PROCEDURE — 97110 THERAPEUTIC EXERCISES: CPT | Performed by: PHYSICAL THERAPIST

## 2023-07-05 PROCEDURE — 97530 THERAPEUTIC ACTIVITIES: CPT | Performed by: PHYSICAL THERAPIST

## 2023-07-05 PROCEDURE — 97112 NEUROMUSCULAR REEDUCATION: CPT | Performed by: PHYSICAL THERAPIST

## 2023-07-05 NOTE — PROGRESS NOTES
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  Flexible laryngoscopy is normal. Vocal cord function is normal.  We discussed that her voice changes are likely functional in nature.  We discussed that she should proceed with her surgery and return to clinic if her voice continues to be hoarse.  We discussed that she will likely need to see 1 of the speech therapist for voice therapy.  Questions answered.   [id number] [facility]  Physical Therapy Daily Treatment Note  Date: 2023  Patient Name: Nicki Dunn  : 1940   MRN: [id number]  Osmany: umer  DOSx: Marianna Rosas  Referring Provider: Jyothi Schmidt PA-C  [address] [place] [address]. Nabila Gardner,  [address]     Medical Diagnosis: Frequent falls (R29.6    Outcome Measure: KENNEDY BALANCE TEST= 34 medium fall risk  *Loss of hearing left ear. S: Patient reports feeling fine after last session. O:   Time 10:20-11:17    Visit     Pain 0/10    ROM     THEREX     Nustep   L5 x 6 min, seat #8, UE #9    Marching 2# x 2 min    Alt. Sidekicks 2# x 2 min    Alt. HS curls 2# x 2 min         THERAPEUTIC ACTIVITIES Large, functional, dynamic, global movements used to build strength, balance, endurance, and flexibility and to improve physical performance. Step ups 6\" x 2 min each    Lt step ups 6\" up & over x 2 min    Calf Raises 2# x 2 min    Squats X 25 to chair    Alt. lunges 2# x 2 min    NMR Balance activities to aid in safe community and home ambulation Feedback and cues necessary for developing neuromuscular control. Movement education and guided movement interventions such as   used to improve performance and control. Marching Gait 4 x 20' +1 CG    Sidestepping 4 x 20' +1 CG    Retrowalk     Heel to toe     Fwd with cones     A:  Tolerated well. No LOB  today also . P: Continue with rehab plan.   Tabatha Estrada, PT    Treatment Charges: Mins Units   Initial Evaluation     Re-Evaluation     Ther Exercise         TE 34 2   Manual Therapy     MT     Ther Activities        TA 15 1   Gait Training          GT     Neuro Re-education NR 8 1   Modalities     Non-Billable Service Time     Other     Total Time/Units 57 4

## 2023-07-07 ENCOUNTER — HOSPITAL ENCOUNTER (OUTPATIENT)
Dept: PHYSICAL THERAPY | Age: 83
Setting detail: THERAPIES SERIES
Discharge: HOME | End: 2023-07-07
Payer: MEDICARE

## 2023-07-07 PROCEDURE — 97110 THERAPEUTIC EXERCISES: CPT

## 2023-07-07 PROCEDURE — 97530 THERAPEUTIC ACTIVITIES: CPT

## 2023-07-07 PROCEDURE — 97112 NEUROMUSCULAR REEDUCATION: CPT

## 2023-07-07 NOTE — PROGRESS NOTES
[id number] [facility]  Physical Therapy Daily Treatment Note  Date: 2023  Patient Name: Renata Todd  : 1940   MRN: [id number]  Osmany: umer  DOSx: Laurel Briscoe  Referring Provider: Deep Ordonez PA-C  [address] [place] [address]. [facility],  [address]     Medical Diagnosis: Frequent falls (R29.6    Outcome Measure: KENNEDY BALANCE TEST= 34 medium fall risk  *Loss of hearing left ear. S: Patient reports feeling fine after last session. He doesn't feel any stronger yet. O:   Time 5600-4229    Visit 10/12    Pain 0/10    ROM     THEREX     Nustep   L5 x 6 min, seat #8, UE #9    Marching 2# x 2 min    Alt. Sidekicks 2# x 2 min    Alt. HS curls 2# x 2 min         THERAPEUTIC ACTIVITIES Large, functional, dynamic, global movements used to build strength, balance, endurance, and flexibility and to improve physical performance. Step ups 6\" x 2 min each    Lt step ups 6\" up & over x 2 min    Calf Raises 2# x 2 min    Alt. lunges 2# x 2 min    Squats X 25 to chair    NMR Balance activities to aid in safe community and home ambulation Feedback and cues necessary for developing neuromuscular control. Movement education and guided movement interventions such as   used to improve performance and control. Marching Gait 4 x 20' +1 CG    Sidestepping 4 x 20' +1 CG    Retrowalk 4 x 20' +1 CG    Heel to toe     Fwd with cones     A:  Tolerated well. No LOB  today, retro walk was challenging. Patient didn't require as much rest today. P: Continue with rehab plan.   Livia Stauffer PTA    Treatment Charges: Mins Units   Initial Evaluation     Re-Evaluation     Ther Exercise         TE 18 1   Manual Therapy     MT     Ther Activities        TA 12 1   Gait Training          GT     Neuro Re-education NR 8 1   Modalities     Non-Billable Service Time     Other     Total Time/Units 38 3

## 2023-07-10 ENCOUNTER — HOSPITAL ENCOUNTER (OUTPATIENT)
Dept: PHYSICAL THERAPY | Age: 83
Setting detail: THERAPIES SERIES
Discharge: HOME | End: 2023-07-10
Payer: MEDICARE

## 2023-07-10 PROCEDURE — 97530 THERAPEUTIC ACTIVITIES: CPT | Performed by: PHYSICAL THERAPIST

## 2023-07-10 PROCEDURE — 97112 NEUROMUSCULAR REEDUCATION: CPT | Performed by: PHYSICAL THERAPIST

## 2023-07-10 PROCEDURE — 97110 THERAPEUTIC EXERCISES: CPT | Performed by: PHYSICAL THERAPIST

## 2023-07-10 NOTE — PROGRESS NOTES
[id number] [facility]  Physical Therapy Daily Treatment Note  Date: 7/10/2023  Patient Name: Jenell Crigler  :    MRN: [id number]  Osmany: umer  DOSx: Laurie Reef  Referring Provider: Chelsea Choe PA-C  [facility]. Jeremy Serrano,  [address]     Medical Diagnosis: Frequent falls (R29.6    Outcome Measure: KENNEDY BALANCE TEST= 34 medium fall risk  *Loss of hearing left ear. S: Patient reports feeling fine after last session. He doesn't feel any stronger yet. O:   Time 0116-9064    Visit     Pain 0/10    ROM     THEREX     Nustep   L5 x 6 min, seat #8, UE #9    Marching 2# x 2 min    Alt. Sidekicks 2# x 2 min    Alt. HS curls 2# x 2 min         THERAPEUTIC ACTIVITIES Large, functional, dynamic, global movements used to build strength, balance, endurance, and flexibility and to improve physical performance. Step ups 6\" x 2 min each    Lt step ups 6\" up & over x 2 min    Calf Raises 2# x 2 min    Alt. lunges 2# x 2 min    Squats X 25 to chair    NMR Balance activities to aid in safe community and home ambulation Feedback and cues necessary for developing neuromuscular control. Movement education and guided movement interventions such as   used to improve performance and control. Marching Gait 4 x 20' +1 CG    Sidestepping 4 x 20' +1 CG    Retrowalk 4 x 20' +1 CG    Heel to toe     Fwd with cones     A:  Tolerated well. No LOB  today, retro walk was challenging. Patient didn't require as much rest today. P: Continue with rehab plan.   Era Kaushal, PT    Treatment Charges: Mins Units   Initial Evaluation     Re-Evaluation     Ther Exercise         TE 30 2   Manual Therapy     MT     Ther Activities        TA 20 1   Gait Training          GT     Neuro Re-education NR 10 1   Modalities     Non-Billable Service Time     Other     Total Time/Units 60 4

## 2023-07-11 NOTE — PROGRESS NOTES
Physical Therapy     DISCHARGE NOTE    PATIENT: Donny Conti  Date: 7/11/2023  DIAGNOSIS: Frequent falls (R29.6  PHYSICIAN: Carmelina Johnson PA-C  [address] [place] [address]. [place],  [address] [place] [address]     ATTENDANCE:  12  OUT OF 12 APPOINTMENT FROM  6/05/2023 TO  7/12/2023  TREATMENTS RECEIVED:  THEREX, GAIT, BALANCE, HEP,      INITIAL PROBLEM CURRENT STATUS         DECREASED STRENGTH LLE  HIP              Flex= 4-/5              Knee    flex = 4-/5    / =  4-/5  Ankle    pf=      4-/5    df=     4-/5        HIP              Flex= 4/5              Knee    flex = 4+/5    / =  4+/5  Ankle    pf=      4+/5    df=     4+/5        DECREASED STRENGTH RLE  HIP              Flex= 4-/5              Knee    flex = 4-/5    / =  4-/5  Ankle    pf=      4-/5    df=     4-/5      HIP              Flex= 4+/5              Knee    flex = 5-/5    / =  5-/5  Ankle    pf=      4+/5    df=     4+/5            KENNEDY BALANCE TEST= 34 KENNEDY BALANCE TEST= 40             COMMENTS/ RECOMMENDATIONS: Pt has improved KENNEDY BALANCE score , strength in LEs has improved. Pt encouraged to continue with HEP. Pt is discharged from PT at this time.       [address] [facility] [address] YOU FOR THE OPPORTUNITY TO WORK WITH WITH THIS PATIENT  Claudia Raza PT            IF YOU HAVE ANY QUESTIONS OR COMMENTS, PLEASE FEEL FREE TO CONTACT ME AT   [address] [place] [address],[address]  [address]  [address] [facility] [address]  FAX : [phone]  PHONE: [phone]

## 2023-07-12 ENCOUNTER — HOSPITAL ENCOUNTER (OUTPATIENT)
Dept: PHYSICAL THERAPY | Age: 83
Setting detail: THERAPIES SERIES
Discharge: HOME | End: 2023-07-12
Payer: MEDICARE

## 2023-07-12 PROCEDURE — 97112 NEUROMUSCULAR REEDUCATION: CPT | Performed by: PHYSICAL THERAPIST

## 2023-07-12 PROCEDURE — 97530 THERAPEUTIC ACTIVITIES: CPT | Performed by: PHYSICAL THERAPIST

## 2023-07-12 PROCEDURE — 97110 THERAPEUTIC EXERCISES: CPT | Performed by: PHYSICAL THERAPIST

## 2023-07-12 NOTE — PROGRESS NOTES
[id number] [facility]  Physical Therapy Daily Treatment Note  Date: 2023  Patient Name: Alejandra Santiago  : 1940   MRN: [id number]  Osmany: umer  DOSx: Ladarius Nunez  Referring Provider: Tabatha Stuart PA-C  [address] [place] [address]. [place],  [address]     Medical Diagnosis: Frequent falls (R29.6    Outcome Measure: KENNEDY BALANCE TEST= 40    *Loss of hearing left ear. S: Patient reports feeling fine after last session. He doesn't feel any stronger yet. O:   Time     Visit     Pain 0/10    ROM     THEREX     Nustep   L5 x 6 min, seat #8, UE #9    Marching 2# x 2 min    Alt. Sidekicks 2# x 2 min    Alt. HS curls 2# x 2 min         THERAPEUTIC ACTIVITIES Large, functional, dynamic, global movements used to build strength, balance, endurance, and flexibility and to improve physical performance. Step ups 6\" x 2 min each    Lt step ups 6\" up & over x 2 min    Calf Raises 2# x 2 min    Alt. lunges 2# x 2 min    Squats X 25 to chair    NMR Balance activities to aid in safe community and home ambulation Feedback and cues necessary for developing neuromuscular control. Movement education and guided movement interventions such as   used to improve performance and control. Marching Gait 4 x 20' +1 CG    Sidestepping 4 x 20' +1 CG    Retrowalk 4 x 20' +1 CG    Heel to toe     Fwd with cones     A:  Tolerated well. No LOB  today, retro walk was challenging. Patient didn't require as much rest today. P: Continue with rehab plan.   Juan Hodges PT    Treatment Charges: Mins Units   Initial Evaluation     Re-Evaluation     Ther Exercise         TE 28 2   Manual Therapy     MT     Ther Activities        TA 18 1   Gait Training          GT     Neuro Re-education NR 10 1   Modalities     Non-Billable Service Time     Other     Total Time/Units 56 4

## 2023-08-09 DIAGNOSIS — R56.9 SEIZURES (HCC): ICD-10-CM

## 2023-08-09 RX ORDER — PHENYTOIN SODIUM 100 MG/1
CAPSULE, EXTENDED RELEASE ORAL
Qty: 360 CAPSULE | Refills: 1 | Status: SHIPPED | OUTPATIENT
Start: 2023-08-09

## 2023-08-22 ENCOUNTER — OFFICE VISIT (OUTPATIENT)
Dept: PRIMARY CARE CLINIC | Age: 83
End: 2023-08-22
Payer: MEDICARE

## 2023-08-22 VITALS
TEMPERATURE: 98.4 F | HEIGHT: 64 IN | HEART RATE: 64 BPM | BODY MASS INDEX: 29.02 KG/M2 | DIASTOLIC BLOOD PRESSURE: 74 MMHG | SYSTOLIC BLOOD PRESSURE: 114 MMHG | OXYGEN SATURATION: 95 % | WEIGHT: 170 LBS

## 2023-08-22 DIAGNOSIS — E78.5 HYPERLIPIDEMIA, UNSPECIFIED HYPERLIPIDEMIA TYPE: Primary | ICD-10-CM

## 2023-08-22 DIAGNOSIS — E78.5 HYPERLIPIDEMIA, UNSPECIFIED HYPERLIPIDEMIA TYPE: ICD-10-CM

## 2023-08-22 DIAGNOSIS — R56.9 SEIZURES (HCC): ICD-10-CM

## 2023-08-22 DIAGNOSIS — E03.9 ACQUIRED HYPOTHYROIDISM: ICD-10-CM

## 2023-08-22 LAB
ABSOLUTE IMMATURE GRANULOCYTE: <0.03 K/UL (ref 0–0.58)
BASOPHILS ABSOLUTE: 0.04 K/UL (ref 0–0.2)
BASOPHILS RELATIVE PERCENT: 1 % (ref 0–2)
EOSINOPHILS ABSOLUTE: 0.14 K/UL (ref 0.05–0.5)
EOSINOPHILS RELATIVE PERCENT: 2 % (ref 0–6)
HCT VFR BLD CALC: 44.8 % (ref 37–54)
HEMOGLOBIN: 14.6 G/DL (ref 12.5–16.5)
IMMATURE GRANULOCYTES: 0 % (ref 0–5)
LYMPHOCYTES ABSOLUTE: 2.61 K/UL (ref 1.5–4)
LYMPHOCYTES RELATIVE PERCENT: 38 % (ref 20–42)
MCH RBC QN AUTO: 33.1 PG (ref 26–35)
MCHC RBC AUTO-ENTMCNC: 32.6 G/DL (ref 32–34.5)
MCV RBC AUTO: 101.6 FL (ref 80–99.9)
MONOCYTES ABSOLUTE: 0.65 K/UL (ref 0.1–0.95)
MONOCYTES RELATIVE PERCENT: 9 % (ref 2–12)
NEUTROPHILS ABSOLUTE: 3.43 K/UL (ref 1.8–7.3)
NEUTROPHILS RELATIVE PERCENT: 50 % (ref 43–80)
PDW BLD-RTO: 13.2 % (ref 11.5–15)
PLATELET # BLD: 203 K/UL (ref 130–450)
PMV BLD AUTO: 11.3 FL (ref 7–12)
RBC # BLD: 4.41 M/UL (ref 3.8–5.8)
WBC # BLD: 6.9 K/UL (ref 4.5–11.5)

## 2023-08-22 PROCEDURE — 36415 COLL VENOUS BLD VENIPUNCTURE: CPT | Performed by: FAMILY MEDICINE

## 2023-08-22 PROCEDURE — 1123F ACP DISCUSS/DSCN MKR DOCD: CPT | Performed by: FAMILY MEDICINE

## 2023-08-22 PROCEDURE — 99214 OFFICE O/P EST MOD 30 MIN: CPT | Performed by: FAMILY MEDICINE

## 2023-08-22 RX ORDER — AMOXICILLIN 250 MG
1 CAPSULE ORAL DAILY
COMMUNITY

## 2023-08-22 ASSESSMENT — ENCOUNTER SYMPTOMS
SORE THROAT: 0
BLOOD IN STOOL: 0
SINUS PRESSURE: 0
COLOR CHANGE: 0
EYE ITCHING: 0
EYE DISCHARGE: 0
FACIAL SWELLING: 0
ABDOMINAL DISTENTION: 0
PHOTOPHOBIA: 0
NAUSEA: 0
DIARRHEA: 0
CONSTIPATION: 0
SHORTNESS OF BREATH: 0
WHEEZING: 0
COUGH: 0
EYE PAIN: 0
VOMITING: 0
ABDOMINAL PAIN: 0
RHINORRHEA: 0

## 2023-08-22 NOTE — PROGRESS NOTES
Venipuncture was obtained from left arm. Patient tolerated the procedure without complications or complaints.

## 2023-08-22 NOTE — PROGRESS NOTES
Serafin Chaudhary (:  1940) is a 80 y.o. male,Established patient, here for evaluation of the following chief complaint(s):  6 Month Follow-Up (Labs Pt overall feels well)         ASSESSMENT/PLAN:  1. Hyperlipidemia, unspecified hyperlipidemia type  -     CBC with Auto Differential; Future  -     Comprehensive Metabolic Panel, Fasting; Future  -     Lipid Panel; Future  2. Acquired hypothyroidism  -     CBC with Auto Differential; Future  -     Comprehensive Metabolic Panel, Fasting; Future  -     TSH; Future  3. Seizures ([address])  -     CBC with Auto Differential; Future  -     Comprehensive Metabolic Panel, Fasting; Future  -     Phenytoin Level, Total; Future        PLAN:  Labs drawn. Patient declines hearing evaluation. Immunization records reviewed. Patient informed to contact office if he wishes a hearing evaluation. Return in about 6 months (around 2024) for Labs, AWV. Subjective   SUBJECTIVE/OBJECTIVE:  Patient here for routine follow-up and labs. No complaints at the present time. Physical therapy was very effective. Has had no further falls or seizures. Review of Systems   Constitutional:  Negative for chills, fatigue and fever. HENT:  Positive for hearing loss. Negative for congestion, ear discharge, ear pain, facial swelling, nosebleeds, rhinorrhea, sinus pressure and sore throat. Eyes:  Negative for photophobia, pain, discharge, itching and visual disturbance. Respiratory:  Negative for cough, shortness of breath and wheezing. Cardiovascular:  Negative for chest pain, palpitations and leg swelling. Gastrointestinal:  Negative for abdominal distention, abdominal pain, blood in stool, constipation, diarrhea, nausea and vomiting. Endocrine: Negative for polydipsia, polyphagia and polyuria. Genitourinary:  Negative for difficulty urinating, dysuria, frequency, hematuria and urgency. Musculoskeletal:  Positive for arthralgias (Bilateral shoulders).  Negative for

## 2023-08-23 LAB
ALBUMIN SERPL-MCNC: 4.3 G/DL (ref 3.5–5.2)
ALP BLD-CCNC: 70 U/L (ref 40–129)
ALT SERPL-CCNC: 21 U/L (ref 0–40)
ANION GAP SERPL CALCULATED.3IONS-SCNC: 17 MMOL/L (ref 7–16)
AST SERPL-CCNC: 27 U/L (ref 0–39)
BILIRUB SERPL-MCNC: 0.5 MG/DL (ref 0–1.2)
BUN BLDV-MCNC: 14 MG/DL (ref 6–23)
CALCIUM SERPL-MCNC: 9.2 MG/DL (ref 8.6–10.2)
CHLORIDE BLD-SCNC: 104 MMOL/L (ref 98–107)
CHOLESTEROL: 172 MG/DL
CO2: 22 MMOL/L (ref 22–29)
CREAT SERPL-MCNC: 0.7 MG/DL (ref 0.7–1.2)
GFR SERPL CREATININE-BSD FRML MDRD: >60 ML/MIN/1.73M2
GLUCOSE FASTING: 91 MG/DL (ref 74–99)
HDLC SERPL-MCNC: 55 MG/DL
LDL CHOLESTEROL: 92 MG/DL
PHENYTOIN LEVEL: 24.6 UG/ML (ref 10–20)
POTASSIUM SERPL-SCNC: 4.4 MMOL/L (ref 3.5–5)
SODIUM BLD-SCNC: 143 MMOL/L (ref 132–146)
TOTAL PROTEIN: 7 G/DL (ref 6.4–8.3)
TRIGL SERPL-MCNC: 125 MG/DL
TSH SERPL DL<=0.05 MIU/L-ACNC: 0.45 UIU/ML (ref 0.27–4.2)
VLDLC SERPL CALC-MCNC: 25 MG/DL

## 2023-08-31 ENCOUNTER — TELEPHONE (OUTPATIENT)
Dept: PRIMARY CARE CLINIC | Age: 83
End: 2023-08-31

## 2023-08-31 NOTE — TELEPHONE ENCOUNTER
Patient's spouse returned call to the office in regards to his lab results. Informed patient's spouse of the following per Dr. Marce Rushing:     Labs reviewed. Dilantin level was high at 24.6. Decrease Dilantin to 3 tablets daily. May take 2 in the morning and 1 in the evening or vice versa. Recheck appointment 3 months with repeat Dilantin level.     Patient scheduled 3 month recheck for 11/29/23 @ 10 AM.

## 2023-10-13 RX ORDER — ATORVASTATIN CALCIUM 20 MG/1
TABLET, FILM COATED ORAL
Qty: 90 TABLET | Refills: 1 | Status: SHIPPED | OUTPATIENT
Start: 2023-10-13

## 2023-11-29 ENCOUNTER — OFFICE VISIT (OUTPATIENT)
Dept: PRIMARY CARE CLINIC | Age: 83
End: 2023-11-29
Payer: MEDICARE

## 2023-11-29 VITALS
TEMPERATURE: 98.1 F | HEIGHT: 64 IN | HEART RATE: 65 BPM | BODY MASS INDEX: 28.51 KG/M2 | OXYGEN SATURATION: 95 % | WEIGHT: 167 LBS | DIASTOLIC BLOOD PRESSURE: 80 MMHG | SYSTOLIC BLOOD PRESSURE: 118 MMHG

## 2023-11-29 DIAGNOSIS — E06.3 HYPOTHYROIDISM DUE TO HASHIMOTO'S THYROIDITIS: ICD-10-CM

## 2023-11-29 DIAGNOSIS — R56.9 SEIZURES (HCC): ICD-10-CM

## 2023-11-29 DIAGNOSIS — E03.8 HYPOTHYROIDISM DUE TO HASHIMOTO'S THYROIDITIS: ICD-10-CM

## 2023-11-29 DIAGNOSIS — R78.89 ELEVATED DILANTIN LEVEL: ICD-10-CM

## 2023-11-29 DIAGNOSIS — E78.2 MODERATE MIXED HYPERLIPIDEMIA NOT REQUIRING STATIN THERAPY: Primary | ICD-10-CM

## 2023-11-29 DIAGNOSIS — E78.2 MODERATE MIXED HYPERLIPIDEMIA NOT REQUIRING STATIN THERAPY: ICD-10-CM

## 2023-11-29 LAB
ABSOLUTE IMMATURE GRANULOCYTE: <0.03 K/UL (ref 0–0.58)
ALBUMIN SERPL-MCNC: 4.3 G/DL (ref 3.5–5.2)
ALP BLD-CCNC: 78 U/L (ref 40–129)
ALT SERPL-CCNC: 22 U/L (ref 0–40)
ANION GAP SERPL CALCULATED.3IONS-SCNC: 10 MMOL/L (ref 7–16)
AST SERPL-CCNC: 24 U/L (ref 0–39)
BASOPHILS ABSOLUTE: 0.04 K/UL (ref 0–0.2)
BASOPHILS RELATIVE PERCENT: 1 % (ref 0–2)
BILIRUB SERPL-MCNC: 0.4 MG/DL (ref 0–1.2)
BUN BLDV-MCNC: 17 MG/DL (ref 6–23)
CALCIUM SERPL-MCNC: 9.3 MG/DL (ref 8.6–10.2)
CHLORIDE BLD-SCNC: 105 MMOL/L (ref 98–107)
CHOLESTEROL: 161 MG/DL
CO2: 26 MMOL/L (ref 22–29)
CREAT SERPL-MCNC: 0.8 MG/DL (ref 0.7–1.2)
EOSINOPHILS ABSOLUTE: 0.13 K/UL (ref 0.05–0.5)
EOSINOPHILS RELATIVE PERCENT: 2 % (ref 0–6)
GFR SERPL CREATININE-BSD FRML MDRD: >60 ML/MIN/1.73M2
GLUCOSE FASTING: 91 MG/DL (ref 74–99)
HCT VFR BLD CALC: 44.3 % (ref 37–54)
HDLC SERPL-MCNC: 61 MG/DL
HEMOGLOBIN: 14.6 G/DL (ref 12.5–16.5)
IMMATURE GRANULOCYTES: 0 % (ref 0–5)
LDL CHOLESTEROL: 76 MG/DL
LYMPHOCYTES ABSOLUTE: 2.39 K/UL (ref 1.5–4)
LYMPHOCYTES RELATIVE PERCENT: 29 % (ref 20–42)
MCH RBC QN AUTO: 33 PG (ref 26–35)
MCHC RBC AUTO-ENTMCNC: 33 G/DL (ref 32–34.5)
MCV RBC AUTO: 100.2 FL (ref 80–99.9)
MONOCYTES ABSOLUTE: 0.79 K/UL (ref 0.1–0.95)
MONOCYTES RELATIVE PERCENT: 9 % (ref 2–12)
NEUTROPHILS ABSOLUTE: 5.01 K/UL (ref 1.8–7.3)
NEUTROPHILS RELATIVE PERCENT: 60 % (ref 43–80)
PDW BLD-RTO: 12.7 % (ref 11.5–15)
PHENYTOIN LEVEL: 9.6 UG/ML (ref 10–20)
PLATELET # BLD: 173 K/UL (ref 130–450)
PMV BLD AUTO: 11.7 FL (ref 7–12)
POTASSIUM SERPL-SCNC: 4.6 MMOL/L (ref 3.5–5)
RBC # BLD: 4.42 M/UL (ref 3.8–5.8)
SODIUM BLD-SCNC: 141 MMOL/L (ref 132–146)
TOTAL PROTEIN: 6.9 G/DL (ref 6.4–8.3)
TRIGL SERPL-MCNC: 122 MG/DL
TSH SERPL DL<=0.05 MIU/L-ACNC: 0.19 UIU/ML (ref 0.27–4.2)
VLDLC SERPL CALC-MCNC: 24 MG/DL
WBC # BLD: 8.4 K/UL (ref 4.5–11.5)

## 2023-11-29 PROCEDURE — 36415 COLL VENOUS BLD VENIPUNCTURE: CPT | Performed by: FAMILY MEDICINE

## 2023-11-29 PROCEDURE — 99214 OFFICE O/P EST MOD 30 MIN: CPT | Performed by: FAMILY MEDICINE

## 2023-11-29 PROCEDURE — 1123F ACP DISCUSS/DSCN MKR DOCD: CPT | Performed by: FAMILY MEDICINE

## 2023-11-29 RX ORDER — LEVOTHYROXINE SODIUM 0.1 MG/1
100 TABLET ORAL EVERY MORNING
Qty: 90 TABLET | Refills: 1 | Status: SHIPPED | OUTPATIENT
Start: 2023-11-29

## 2023-11-29 ASSESSMENT — ENCOUNTER SYMPTOMS
COUGH: 0
ABDOMINAL PAIN: 0
FACIAL SWELLING: 0
EYE ITCHING: 0
DIARRHEA: 0
SORE THROAT: 0
SINUS PRESSURE: 0
SHORTNESS OF BREATH: 0
BLOOD IN STOOL: 0
RHINORRHEA: 0
EYE DISCHARGE: 0
PHOTOPHOBIA: 0
WHEEZING: 0
VOMITING: 0
CONSTIPATION: 0
NAUSEA: 0
EYE PAIN: 0
ABDOMINAL DISTENTION: 0
COLOR CHANGE: 0

## 2023-11-29 NOTE — PROGRESS NOTES
Venipuncture was obtained from left arm. Patient tolerated the procedure without complications or complaints.
08/22/2023 6.9 4.5 - 11.5 k/uL Final     RBC   Date Value Ref Range Status   08/22/2023 4.41 3.80 - 5.80 m/uL Final     Hemoglobin   Date Value Ref Range Status   08/22/2023 14.6 12.5 - 16.5 g/dL Final     Hematocrit   Date Value Ref Range Status   08/22/2023 44.8 37.0 - 54.0 % Final     MCV   Date Value Ref Range Status   08/22/2023 101.6 (H) 80.0 - 99.9 fL Final     MCH   Date Value Ref Range Status   08/22/2023 33.1 26.0 - 35.0 pg Final     MCHC   Date Value Ref Range Status   08/22/2023 32.6 32.0 - 34.5 g/dL Final     RDW   Date Value Ref Range Status   08/22/2023 13.2 11.5 - 15.0 % Final     Platelets   Date Value Ref Range Status   08/22/2023 203 130 - 450 k/uL Final     MPV   Date Value Ref Range Status   08/22/2023 11.3 7.0 - 12.0 fL Final     Neutrophils %   Date Value Ref Range Status   08/22/2023 50 43.0 - 80.0 % Final     Immature Granulocytes #   Date Value Ref Range Status   08/08/2022 0.02 E9/L Final     Immature Granulocytes %   Date Value Ref Range Status   08/08/2022 0.2 0.0 - 5.0 % Final     Immature Granulocytes   Date Value Ref Range Status   08/22/2023 0 0.0 - 5.0 % Final     Lymphocytes %   Date Value Ref Range Status   08/22/2023 38 20.0 - 42.0 % Final     Monocytes %   Date Value Ref Range Status   08/22/2023 9 2.0 - 12.0 % Final     Eosinophils %   Date Value Ref Range Status   08/22/2023 2 0 - 6 % Final     Basophils %   Date Value Ref Range Status   08/22/2023 1 0.0 - 2.0 % Final     Neutrophils Absolute   Date Value Ref Range Status   08/22/2023 3.43 1.80 - 7.30 k/uL Final     Lymphocytes Absolute   Date Value Ref Range Status   08/22/2023 2.61 1.50 - 4.00 k/uL Final     Monocytes Absolute   Date Value Ref Range Status   08/22/2023 0.65 0.10 - 0.95 k/uL Final     Eosinophils Absolute   Date Value Ref Range Status   08/22/2023 0.14 0.05 - 0.50 k/uL Final     Basophils Absolute   Date Value Ref Range Status   08/22/2023 0.04 0.00 - 0.20 k/uL Final       CMP  Sodium   Date Value Ref Range

## 2024-02-14 DIAGNOSIS — R56.9 SEIZURES (HCC): ICD-10-CM

## 2024-02-14 RX ORDER — PHENYTOIN SODIUM 100 MG/1
CAPSULE, EXTENDED RELEASE ORAL
Qty: 270 CAPSULE | Refills: 1 | Status: SHIPPED | OUTPATIENT
Start: 2024-02-14

## 2024-04-02 RX ORDER — ATORVASTATIN CALCIUM 20 MG/1
TABLET, FILM COATED ORAL
Qty: 90 TABLET | Refills: 1 | Status: SHIPPED | OUTPATIENT
Start: 2024-04-02

## 2024-06-05 ENCOUNTER — OFFICE VISIT (OUTPATIENT)
Dept: PRIMARY CARE CLINIC | Age: 84
End: 2024-06-05
Payer: MEDICARE

## 2024-06-05 VITALS
HEART RATE: 62 BPM | BODY MASS INDEX: 29.53 KG/M2 | TEMPERATURE: 98.9 F | HEIGHT: 64 IN | DIASTOLIC BLOOD PRESSURE: 80 MMHG | WEIGHT: 173 LBS | OXYGEN SATURATION: 96 % | SYSTOLIC BLOOD PRESSURE: 138 MMHG

## 2024-06-05 DIAGNOSIS — R56.9 SEIZURES (HCC): ICD-10-CM

## 2024-06-05 DIAGNOSIS — E78.5 HYPERLIPIDEMIA, UNSPECIFIED HYPERLIPIDEMIA TYPE: ICD-10-CM

## 2024-06-05 DIAGNOSIS — Z00.00 INITIAL MEDICARE ANNUAL WELLNESS VISIT: Primary | ICD-10-CM

## 2024-06-05 DIAGNOSIS — E03.9 ACQUIRED HYPOTHYROIDISM: ICD-10-CM

## 2024-06-05 LAB
ALBUMIN: 4.2 G/DL (ref 3.5–5.2)
ALP BLD-CCNC: 70 U/L (ref 40–129)
ALT SERPL-CCNC: 20 U/L (ref 0–40)
ANION GAP SERPL CALCULATED.3IONS-SCNC: 14 MMOL/L (ref 7–16)
AST SERPL-CCNC: 23 U/L (ref 0–39)
BASOPHILS ABSOLUTE: 0.03 K/UL (ref 0–0.2)
BASOPHILS RELATIVE PERCENT: 0 % (ref 0–2)
BILIRUB SERPL-MCNC: 0.6 MG/DL (ref 0–1.2)
BUN BLDV-MCNC: 17 MG/DL (ref 6–23)
CALCIUM SERPL-MCNC: 9.2 MG/DL (ref 8.6–10.2)
CHLORIDE BLD-SCNC: 105 MMOL/L (ref 98–107)
CHOLESTEROL, TOTAL: 172 MG/DL
CO2: 20 MMOL/L (ref 22–29)
CREAT SERPL-MCNC: 0.7 MG/DL (ref 0.7–1.2)
EOSINOPHILS ABSOLUTE: 0.12 K/UL (ref 0.05–0.5)
EOSINOPHILS RELATIVE PERCENT: 2 % (ref 0–6)
GFR, ESTIMATED: >90 ML/MIN/1.73M2
GLUCOSE FASTING: 85 MG/DL (ref 74–99)
HCT VFR BLD CALC: 45.7 % (ref 37–54)
HDLC SERPL-MCNC: 61 MG/DL
HEMOGLOBIN: 15.2 G/DL (ref 12.5–16.5)
IMMATURE GRANULOCYTES %: 0 % (ref 0–5)
IMMATURE GRANULOCYTES ABSOLUTE: <0.03 K/UL (ref 0–0.58)
LDL CHOLESTEROL: 82 MG/DL
LYMPHOCYTES ABSOLUTE: 2.86 K/UL (ref 1.5–4)
LYMPHOCYTES RELATIVE PERCENT: 35 % (ref 20–42)
MCH RBC QN AUTO: 33.4 PG (ref 26–35)
MCHC RBC AUTO-ENTMCNC: 33.3 G/DL (ref 32–34.5)
MCV RBC AUTO: 100.4 FL (ref 80–99.9)
MONOCYTES ABSOLUTE: 0.86 K/UL (ref 0.1–0.95)
MONOCYTES RELATIVE PERCENT: 11 % (ref 2–12)
NEUTROPHILS ABSOLUTE: 4.28 K/UL (ref 1.8–7.3)
NEUTROPHILS RELATIVE PERCENT: 52 % (ref 43–80)
PDW BLD-RTO: 13.2 % (ref 11.5–15)
PHENYTOIN DATE LAST DOSE: ABNORMAL
PHENYTOIN DOSE AMOUNT: ABNORMAL
PHENYTOIN DOSE TIME: ABNORMAL
PHENYTOIN LEVEL: 8.6 UG/ML (ref 10–20)
PLATELET # BLD: 183 K/UL (ref 130–450)
PMV BLD AUTO: 11.5 FL (ref 7–12)
POTASSIUM SERPL-SCNC: 4.4 MMOL/L (ref 3.5–5)
RBC # BLD: 4.55 M/UL (ref 3.8–5.8)
SODIUM BLD-SCNC: 139 MMOL/L (ref 132–146)
TOTAL PROTEIN: 6.8 G/DL (ref 6.4–8.3)
TRIGL SERPL-MCNC: 144 MG/DL
TSH SERPL DL<=0.05 MIU/L-ACNC: 0.38 UIU/ML (ref 0.27–4.2)
VLDLC SERPL CALC-MCNC: 29 MG/DL
WBC # BLD: 8.2 K/UL (ref 4.5–11.5)

## 2024-06-05 PROCEDURE — 36415 COLL VENOUS BLD VENIPUNCTURE: CPT | Performed by: FAMILY MEDICINE

## 2024-06-05 PROCEDURE — 1123F ACP DISCUSS/DSCN MKR DOCD: CPT | Performed by: FAMILY MEDICINE

## 2024-06-05 PROCEDURE — G0438 PPPS, INITIAL VISIT: HCPCS | Performed by: FAMILY MEDICINE

## 2024-06-05 RX ORDER — LEVOTHYROXINE SODIUM 0.1 MG/1
100 TABLET ORAL DAILY
Qty: 90 TABLET | Refills: 3 | Status: SHIPPED | OUTPATIENT
Start: 2024-06-05

## 2024-06-05 SDOH — ECONOMIC STABILITY: FOOD INSECURITY: WITHIN THE PAST 12 MONTHS, YOU WORRIED THAT YOUR FOOD WOULD RUN OUT BEFORE YOU GOT MONEY TO BUY MORE.: NEVER TRUE

## 2024-06-05 SDOH — ECONOMIC STABILITY: INCOME INSECURITY: HOW HARD IS IT FOR YOU TO PAY FOR THE VERY BASICS LIKE FOOD, HOUSING, MEDICAL CARE, AND HEATING?: NOT HARD AT ALL

## 2024-06-05 SDOH — ECONOMIC STABILITY: FOOD INSECURITY: WITHIN THE PAST 12 MONTHS, THE FOOD YOU BOUGHT JUST DIDN'T LAST AND YOU DIDN'T HAVE MONEY TO GET MORE.: NEVER TRUE

## 2024-06-05 ASSESSMENT — PATIENT HEALTH QUESTIONNAIRE - PHQ9
SUM OF ALL RESPONSES TO PHQ QUESTIONS 1-9: 0
SUM OF ALL RESPONSES TO PHQ QUESTIONS 1-9: 0
2. FEELING DOWN, DEPRESSED OR HOPELESS: NOT AT ALL
SUM OF ALL RESPONSES TO PHQ9 QUESTIONS 1 & 2: 0
SUM OF ALL RESPONSES TO PHQ QUESTIONS 1-9: 0
SUM OF ALL RESPONSES TO PHQ QUESTIONS 1-9: 0
1. LITTLE INTEREST OR PLEASURE IN DOING THINGS: NOT AT ALL

## 2024-06-05 ASSESSMENT — LIFESTYLE VARIABLES
HOW MANY STANDARD DRINKS CONTAINING ALCOHOL DO YOU HAVE ON A TYPICAL DAY: PATIENT DOES NOT DRINK
HOW OFTEN DO YOU HAVE A DRINK CONTAINING ALCOHOL: NEVER

## 2024-06-05 NOTE — PROGRESS NOTES
Venipuncture was obtained from left arm. Patient tolerated the procedure without complications or complaints.  
soft, non-tender, non-distended, normal bowel sounds, no masses or organomegaly  Extremities: no cyanosis, clubbing or edema  Neurologic: speech normal and gait abnormal-ambulating with a cane    Plan:  Patient follows with dermatology, Dr. Son annually.  Labs ordered.  RTO 6 months for follow-up and labs.  Patient declines an audiology evaluation.  Advised Shingrix, RSV, COVID-19 and Tdap vaccinations.    CareTeam (Including outside providers/suppliers regularly involved in providing care):   Patient Care Team:  Amado Huertas DO as PCP - General  Amado Huertas DO as PCP - Empaneled Provider     Reviewed and updated this visit:  Tobacco  Allergies  Meds  Problems  Med Hx  Surg Hx  Soc Hx  Fam Hx

## 2024-08-27 ENCOUNTER — TELEPHONE (OUTPATIENT)
Dept: PRIMARY CARE CLINIC | Age: 84
End: 2024-08-27

## 2024-08-27 DIAGNOSIS — R56.9 SEIZURES (HCC): ICD-10-CM

## 2024-08-27 RX ORDER — PHENYTOIN SODIUM 100 MG/1
CAPSULE, EXTENDED RELEASE ORAL
Qty: 270 CAPSULE | Refills: 1 | Status: SHIPPED | OUTPATIENT
Start: 2024-08-27

## 2024-11-12 NOTE — TELEPHONE ENCOUNTER
Name of Medication(s) Requested:  Requested Prescriptions     Pending Prescriptions Disp Refills    atorvastatin (LIPITOR) 20 MG tablet 90 tablet 1     Sig: Take 1 tablet by mouth daily       Medication is on current medication list Yes    Dosage and directions were verified? Yes    Quantity verified: 90 day supply     Pharmacy Verified?  Yes    Last Appointment:  6/5/2024    Future appts:  Future Appointments   Date Time Provider Department Center   12/19/2024 10:30 AM Amado Huertas DO [facility] DE[facility]   6/10/2025  9:00 AM Amado Huertas DO [facility] DE[facility]        (If no appt send self scheduling link. .REFILLAPPT)  Scheduling request sent?     [] Yes  [x] No    Does patient need updated?  [] Yes  [x] No

## 2024-11-13 RX ORDER — ATORVASTATIN CALCIUM 20 MG/1
20 TABLET, FILM COATED ORAL DAILY
Qty: 90 TABLET | Refills: 1 | Status: SHIPPED | OUTPATIENT
Start: 2024-11-13

## 2024-12-19 ENCOUNTER — OFFICE VISIT (OUTPATIENT)
Dept: PRIMARY CARE CLINIC | Age: 84
End: 2024-12-19

## 2024-12-19 VITALS
WEIGHT: 183 LBS | HEIGHT: 64 IN | BODY MASS INDEX: 31.24 KG/M2 | DIASTOLIC BLOOD PRESSURE: 84 MMHG | HEART RATE: 65 BPM | TEMPERATURE: 97.3 F | SYSTOLIC BLOOD PRESSURE: 120 MMHG | OXYGEN SATURATION: 97 %

## 2024-12-19 DIAGNOSIS — E06.3 HYPOTHYROIDISM DUE TO HASHIMOTO'S THYROIDITIS: ICD-10-CM

## 2024-12-19 DIAGNOSIS — R56.9 SEIZURES (HCC): Primary | ICD-10-CM

## 2024-12-19 DIAGNOSIS — E78.5 HYPERLIPIDEMIA, UNSPECIFIED HYPERLIPIDEMIA TYPE: ICD-10-CM

## 2024-12-19 DIAGNOSIS — H91.93 DECREASED HEARING OF BOTH EARS: ICD-10-CM

## 2024-12-19 LAB
BASOPHILS ABSOLUTE: 0.04 K/UL (ref 0–0.2)
BASOPHILS RELATIVE PERCENT: 1 % (ref 0–2)
EOSINOPHILS ABSOLUTE: 0.12 K/UL (ref 0.05–0.5)
EOSINOPHILS RELATIVE PERCENT: 2 % (ref 0–6)
HCT VFR BLD CALC: 47.6 % (ref 37–54)
HEMOGLOBIN: 15.6 G/DL (ref 12.5–16.5)
IMMATURE GRANULOCYTES %: 0 % (ref 0–5)
IMMATURE GRANULOCYTES ABSOLUTE: 0.03 K/UL (ref 0–0.58)
LYMPHOCYTES ABSOLUTE: 2.61 K/UL (ref 1.5–4)
LYMPHOCYTES RELATIVE PERCENT: 34 % (ref 20–42)
MCH RBC QN AUTO: 32.8 PG (ref 26–35)
MCHC RBC AUTO-ENTMCNC: 32.8 G/DL (ref 32–34.5)
MCV RBC AUTO: 100 FL (ref 80–99.9)
MONOCYTES ABSOLUTE: 0.7 K/UL (ref 0.1–0.95)
MONOCYTES RELATIVE PERCENT: 9 % (ref 2–12)
NEUTROPHILS ABSOLUTE: 4.13 K/UL (ref 1.8–7.3)
NEUTROPHILS RELATIVE PERCENT: 54 % (ref 43–80)
PDW BLD-RTO: 13 % (ref 11.5–15)
PLATELET # BLD: 204 K/UL (ref 130–450)
PMV BLD AUTO: 11.5 FL (ref 7–12)
RBC # BLD: 4.76 M/UL (ref 3.8–5.8)
WBC # BLD: 7.6 K/UL (ref 4.5–11.5)

## 2024-12-19 ASSESSMENT — ENCOUNTER SYMPTOMS
PHOTOPHOBIA: 0
SORE THROAT: 0
EYE DISCHARGE: 0
WHEEZING: 0
COLOR CHANGE: 0
ABDOMINAL DISTENTION: 0
BLOOD IN STOOL: 0
FACIAL SWELLING: 0
DIARRHEA: 0
COUGH: 0
EYE ITCHING: 0
NAUSEA: 0
EYE PAIN: 0
SINUS PRESSURE: 0
VOMITING: 0
CONSTIPATION: 0
SHORTNESS OF BREATH: 0
ABDOMINAL PAIN: 0
RHINORRHEA: 0

## 2024-12-19 NOTE — PROGRESS NOTES
Venipuncture was obtained from left arm. Patient tolerated the procedure without complications or complaints.  
    MCHC   Date Value Ref Range Status   06/05/2024 33.3 32.0 - 34.5 g/dL Final     RDW   Date Value Ref Range Status   06/05/2024 13.2 11.5 - 15.0 % Final     Platelets   Date Value Ref Range Status   06/05/2024 183 130 - 450 k/uL Final     MPV   Date Value Ref Range Status   06/05/2024 11.5 7.0 - 12.0 fL Final     Neutrophils %   Date Value Ref Range Status   06/05/2024 52 43.0 - 80.0 % Final     Immature Granulocytes #   Date Value Ref Range Status   08/08/2022 0.02 E9/L Final     Immature Granulocytes %   Date Value Ref Range Status   06/05/2024 0 0.0 - 5.0 % Final   08/08/2022 0.2 0.0 - 5.0 % Final     Lymphocytes %   Date Value Ref Range Status   06/05/2024 35 20.0 - 42.0 % Final     Monocytes %   Date Value Ref Range Status   06/05/2024 11 2.0 - 12.0 % Final     Basophils %   Date Value Ref Range Status   06/05/2024 0 0.0 - 2.0 % Final     Neutrophils Absolute   Date Value Ref Range Status   06/05/2024 4.28 1.80 - 7.30 k/uL Final     Lymphocytes Absolute   Date Value Ref Range Status   06/05/2024 2.86 1.50 - 4.00 k/uL Final     Monocytes Absolute   Date Value Ref Range Status   06/05/2024 0.86 0.10 - 0.95 k/uL Final     Eosinophils Absolute   Date Value Ref Range Status   06/05/2024 0.12 0.05 - 0.50 k/uL Final     Basophils Absolute   Date Value Ref Range Status   06/05/2024 0.03 0.00 - 0.20 k/uL Final       CMP  Sodium   Date Value Ref Range Status   06/05/2024 139 132 - 146 mmol/L Final     Potassium   Date Value Ref Range Status   06/05/2024 4.4 3.5 - 5.0 mmol/L Final     Potassium reflex Magnesium   Date Value Ref Range Status   06/11/2021 4.2 3.5 - 5.0 mmol/L Final     Chloride   Date Value Ref Range Status   06/05/2024 105 98 - 107 mmol/L Final     CO2   Date Value Ref Range Status   06/05/2024 20 (L) 22 - 29 mmol/L Final     Anion Gap   Date Value Ref Range Status   06/05/2024 14 7 - 16 mmol/L Final     Glucose   Date Value Ref Range Status   07/20/2021 87 74 - 99 mg/dL Final   01/10/2012 92 70 - 110

## 2024-12-20 LAB
ALBUMIN: 4.4 G/DL (ref 3.5–5.2)
ALP BLD-CCNC: 87 U/L (ref 40–129)
ALT SERPL-CCNC: 23 U/L (ref 0–40)
ANION GAP SERPL CALCULATED.3IONS-SCNC: 10 MMOL/L (ref 7–16)
AST SERPL-CCNC: 29 U/L (ref 0–39)
BILIRUB SERPL-MCNC: 0.6 MG/DL (ref 0–1.2)
BUN BLDV-MCNC: 17 MG/DL (ref 6–23)
CALCIUM SERPL-MCNC: 9.2 MG/DL (ref 8.6–10.2)
CHLORIDE BLD-SCNC: 101 MMOL/L (ref 98–107)
CHOLESTEROL, TOTAL: 186 MG/DL
CO2: 26 MMOL/L (ref 22–29)
CREAT SERPL-MCNC: 0.8 MG/DL (ref 0.7–1.2)
GFR, ESTIMATED: 87 ML/MIN/1.73M2
GLUCOSE FASTING: 84 MG/DL (ref 74–99)
HDLC SERPL-MCNC: 59 MG/DL
LDL CHOLESTEROL: 90 MG/DL
PHENYTOIN DATE LAST DOSE: NORMAL
PHENYTOIN DOSE AMOUNT: NORMAL
PHENYTOIN DOSE TIME: NORMAL
PHENYTOIN LEVEL: 10.7 UG/ML (ref 10–20)
POTASSIUM SERPL-SCNC: 4.6 MMOL/L (ref 3.5–5)
SODIUM BLD-SCNC: 137 MMOL/L (ref 132–146)
TOTAL PROTEIN: 7.4 G/DL (ref 6.4–8.3)
TRIGL SERPL-MCNC: 186 MG/DL
TSH SERPL DL<=0.05 MIU/L-ACNC: 0.4 UIU/ML (ref 0.27–4.2)
VLDLC SERPL CALC-MCNC: 37 MG/DL

## 2025-01-07 ENCOUNTER — HOSPITAL ENCOUNTER (OUTPATIENT)
Dept: ULTRASOUND IMAGING | Age: 85
Discharge: HOME | End: 2025-01-07
Attending: FAMILY MEDICINE
Payer: MEDICARE

## 2025-01-07 DIAGNOSIS — E06.3 HYPOTHYROIDISM DUE TO HASHIMOTO'S THYROIDITIS: ICD-10-CM

## 2025-01-07 PROCEDURE — 76536 US EXAM OF HEAD AND NECK: CPT

## 2025-01-14 DIAGNOSIS — E04.2 MULTIPLE THYROID NODULES: Primary | ICD-10-CM

## 2025-02-28 DIAGNOSIS — R56.9 SEIZURES (HCC): ICD-10-CM

## 2025-02-28 NOTE — TELEPHONE ENCOUNTER
Requesting a refill on   Phenytoin 100mg   [facility]     Last Appointment:  12/19/2024  Future Appointments   Date Time Provider Department Center   6/10/2025  9:00 AM Amado Huertas DO [facility] DEP

## 2025-03-01 RX ORDER — PHENYTOIN SODIUM 100 MG/1
CAPSULE, EXTENDED RELEASE ORAL
Qty: 270 CAPSULE | Refills: 1 | Status: SHIPPED | OUTPATIENT
Start: 2025-03-01

## 2025-05-05 ENCOUNTER — TELEPHONE (OUTPATIENT)
Dept: PRIMARY CARE CLINIC | Age: 85
End: 2025-05-05

## 2025-05-05 RX ORDER — ATORVASTATIN CALCIUM 20 MG/1
20 TABLET, FILM COATED ORAL DAILY
Qty: 90 TABLET | Refills: 1 | Status: SHIPPED | OUTPATIENT
Start: 2025-05-05

## 2025-05-05 NOTE — TELEPHONE ENCOUNTER
Name of Medication(s) Requested:  Requested Prescriptions     Pending Prescriptions Disp Refills    atorvastatin (LIPITOR) 20 MG tablet 90 tablet 1     Sig: Take 1 tablet by mouth daily       Medication is on current medication list Yes    Dosage and directions were verified? Yes    Quantity verified: 90 day supply     Pharmacy Verified?  Yes    Last Appointment:  12/19/2024    Future appts:  Future Appointments   Date Time Provider Department Center   6/10/2025  9:00 AM Amado Huertas DO [facility] DEP        (If no appt send self scheduling link. .REFILLAPPT)  Scheduling request sent?     [] Yes  [x] No    Does patient need updated?  [] Yes  [x] No

## 2025-05-05 NOTE — TELEPHONE ENCOUNTER
Refill request      Atorvastatin 20 mg #90  Si qd      [facility] ([facility])        Last Appt:  24  Next Appt: 6/10/25

## 2025-06-10 ENCOUNTER — OFFICE VISIT (OUTPATIENT)
Dept: PRIMARY CARE CLINIC | Age: 85
End: 2025-06-10

## 2025-06-10 VITALS
WEIGHT: 176 LBS | TEMPERATURE: 97.6 F | HEIGHT: 64 IN | BODY MASS INDEX: 30.05 KG/M2 | HEART RATE: 77 BPM | SYSTOLIC BLOOD PRESSURE: 110 MMHG | OXYGEN SATURATION: 99 % | DIASTOLIC BLOOD PRESSURE: 62 MMHG

## 2025-06-10 DIAGNOSIS — R56.9 SEIZURES (HCC): ICD-10-CM

## 2025-06-10 DIAGNOSIS — H91.93 DECREASED HEARING OF BOTH EARS: ICD-10-CM

## 2025-06-10 DIAGNOSIS — Z00.00 MEDICARE ANNUAL WELLNESS VISIT, SUBSEQUENT: Primary | ICD-10-CM

## 2025-06-10 DIAGNOSIS — E55.9 VITAMIN D INSUFFICIENCY: ICD-10-CM

## 2025-06-10 DIAGNOSIS — E03.9 ACQUIRED HYPOTHYROIDISM: ICD-10-CM

## 2025-06-10 DIAGNOSIS — E78.5 HYPERLIPIDEMIA, UNSPECIFIED HYPERLIPIDEMIA TYPE: ICD-10-CM

## 2025-06-10 LAB
ALBUMIN: 4.2 G/DL (ref 3.5–5.2)
ALP BLD-CCNC: 74 U/L (ref 40–129)
ALT SERPL-CCNC: 26 U/L (ref 0–50)
ANION GAP SERPL CALCULATED.3IONS-SCNC: 10 MMOL/L (ref 7–16)
AST SERPL-CCNC: 32 U/L (ref 0–50)
BASOPHILS ABSOLUTE: 0.05 K/UL (ref 0–0.2)
BASOPHILS RELATIVE PERCENT: 1 % (ref 0–2)
BILIRUB SERPL-MCNC: 0.5 MG/DL (ref 0–1.2)
BUN BLDV-MCNC: 13 MG/DL (ref 8–23)
CALCIUM SERPL-MCNC: 9.2 MG/DL (ref 8.8–10.2)
CHLORIDE BLD-SCNC: 104 MMOL/L (ref 98–107)
CHOLESTEROL, TOTAL: 165 MG/DL
CO2: 25 MMOL/L (ref 22–29)
CREAT SERPL-MCNC: 0.8 MG/DL (ref 0.7–1.2)
EOSINOPHILS ABSOLUTE: 0.16 K/UL (ref 0.05–0.5)
EOSINOPHILS RELATIVE PERCENT: 2 % (ref 0–6)
GFR, ESTIMATED: 89 ML/MIN/1.73M2
GLUCOSE FASTING: 91 MG/DL (ref 74–99)
HCT VFR BLD CALC: 45 % (ref 37–54)
HDLC SERPL-MCNC: 51 MG/DL
HEMOGLOBIN: 15.1 G/DL (ref 12.5–16.5)
IMMATURE GRANULOCYTES %: 0 % (ref 0–5)
IMMATURE GRANULOCYTES ABSOLUTE: 0.03 K/UL (ref 0–0.58)
LDL CHOLESTEROL: 71 MG/DL
LYMPHOCYTES ABSOLUTE: 2.6 K/UL (ref 1.5–4)
LYMPHOCYTES RELATIVE PERCENT: 33 % (ref 20–42)
MCH RBC QN AUTO: 33.6 PG (ref 26–35)
MCHC RBC AUTO-ENTMCNC: 33.6 G/DL (ref 32–34.5)
MCV RBC AUTO: 100 FL (ref 80–99.9)
MONOCYTES ABSOLUTE: 0.72 K/UL (ref 0.1–0.95)
MONOCYTES RELATIVE PERCENT: 9 % (ref 2–12)
NEUTROPHILS ABSOLUTE: 4.34 K/UL (ref 1.8–7.3)
NEUTROPHILS RELATIVE PERCENT: 55 % (ref 43–80)
PDW BLD-RTO: 12.8 % (ref 11.5–15)
PHENYTOIN DATE LAST DOSE: NORMAL
PHENYTOIN DOSE AMOUNT: NORMAL
PHENYTOIN DOSE TIME: NORMAL
PHENYTOIN LEVEL: 10.8 UG/ML (ref 10–20)
PLATELET # BLD: 190 K/UL (ref 130–450)
PMV BLD AUTO: 11.3 FL (ref 7–12)
POTASSIUM SERPL-SCNC: 4.7 MMOL/L (ref 3.5–5.1)
RBC # BLD: 4.5 M/UL (ref 3.8–5.8)
SODIUM BLD-SCNC: 139 MMOL/L (ref 136–145)
TOTAL PROTEIN: 7 G/DL (ref 6.4–8.3)
TRIGL SERPL-MCNC: 213 MG/DL
TSH SERPL DL<=0.05 MIU/L-ACNC: 0.42 UIU/ML (ref 0.27–4.2)
VITAMIN D 25-HYDROXY: 35.2 NG/ML (ref 30–100)
VLDLC SERPL CALC-MCNC: 43 MG/DL
WBC # BLD: 7.9 K/UL (ref 4.5–11.5)

## 2025-06-10 SDOH — ECONOMIC STABILITY: FOOD INSECURITY: WITHIN THE PAST 12 MONTHS, THE FOOD YOU BOUGHT JUST DIDN'T LAST AND YOU DIDN'T HAVE MONEY TO GET MORE.: NEVER TRUE

## 2025-06-10 SDOH — ECONOMIC STABILITY: FOOD INSECURITY: WITHIN THE PAST 12 MONTHS, YOU WORRIED THAT YOUR FOOD WOULD RUN OUT BEFORE YOU GOT MONEY TO BUY MORE.: NEVER TRUE

## 2025-06-10 ASSESSMENT — PATIENT HEALTH QUESTIONNAIRE - PHQ9
SUM OF ALL RESPONSES TO PHQ QUESTIONS 1-9: 0
2. FEELING DOWN, DEPRESSED OR HOPELESS: NOT AT ALL
SUM OF ALL RESPONSES TO PHQ QUESTIONS 1-9: 0
SUM OF ALL RESPONSES TO PHQ QUESTIONS 1-9: 0
1. LITTLE INTEREST OR PLEASURE IN DOING THINGS: NOT AT ALL
SUM OF ALL RESPONSES TO PHQ QUESTIONS 1-9: 0

## 2025-06-10 NOTE — PROGRESS NOTES
Medicare Annual Wellness Visit    [facility] A[facility]a is here for Medicare AWV (Subsequent Medicare [facility]WE Labs)    Assessment & Plan   Medicare annual wellness visit, subsequent  -     CBC with Auto Differential  -     Comprehensive Metabolic Panel, Fasting  -     Lipid Panel  Seizures (HCC)  -     CBC with Auto Differential  -     Comprehensive Metabolic Panel, Fasting  -     Lipid Panel  -     Phenytoin Level, Total  Hyperlipidemia, unspecified hyperlipidemia type  -     Comprehensive Metabolic Panel, Fasting  -     Lipid Panel  Acquired hypothyroidism  -     TSH  Vitamin D insufficiency  -     Vitamin D 25 Hydroxy  Decreased hearing of both ears     Return for Medicare Annual Wellness Visit in 1 year.     Subjective       Patient's complete Health Risk Assessment and screening values have been reviewed and are found in Flowsheets. The following problems were reviewed today and where indicated follow up appointments were made and/or referrals ordered.    Positive Risk Factor Screenings with Interventions:               Interventions:  Recommendations: Recommend low impact, moderate intensity cardiovascular exercise minimum 150 minutes weekly.     Abnormal BMI (obese):  Body mass index is 30.21 kg/m². (!) Abnormal  Interventions:  low carbohydrate diet, exercise for at least 150 minutes/week         Hearing Screen:  Do you or your family notice any trouble with your hearing that hasn't been managed with hearing aids?: (!) Yes    Interventions:  Patient declines any further evaluation or treatment    Vision Screen:  Interventions:   Patient encouraged to make appointment with their eye specialist     ADL's:   Patient reports needing help with:  Select all that apply: (!) Telephone Use  Interventions:  Patient declined any further interventions or treatment                  Objective   Vitals:    06/10/25 0906   BP: 110/62   Patient Position: Sitting   Pulse: 77   Temp: 97.6 °F (36.4 °C)   TempSrc: Temporal   SpO2: 99%

## 2025-06-10 NOTE — PATIENT INSTRUCTIONS
and cause a medicine taste in the mouth.  Follow-up care is a key part of your treatment and safety. Be sure to make and go to all appointments, and call your doctor if you are having problems. It's also a good idea to know your test results and keep a list of the medicines you take.  Where can you learn more?  Go to https://www.[email or web address].net/patientEd and enter G551 to learn more about \"Learning About Vision Tests.\"  Current as of: July 31, 2024  Content Version: 14.5  © 3030-7049 [employer].   Care instructions adapted under license by [employer]. If you have questions about a medical condition or this instruction, always ask your healthcare professional. [employer] disclaims any warranty or liability for your use of this information.         Learning About Activities of Daily Living  What are activities of daily living?     Activities of daily living (ADLs) are the basic self-care tasks you do every day. These include eating, bathing, dressing, and moving around.  As you age, and if you have health problems, you may find that it's harder to do some of these tasks. If so, your doctor can suggest ideas that may help.  To measure what kind of help you may need, your doctor will ask how well you are able to do ADLs. Let your doctor know if there are any tasks that you are having trouble doing. This is an important first step to getting help. And when you have the help you need, you can stay as independent as possible.  How will a doctor assess your ADLs?  Asking about ADLs is part of a routine health checkup your doctor will likely do as you age. Your health check might be done in a doctor's office, in your home, or at a hospital. The goal is to find out if you are having any problems that could make it hard to care for yourself or that make it unsafe for you to be on your own.  To measure your ADLs, your doctor will ask how hard it is for you to do routine tasks. Your doctor may also want

## 2025-08-13 DIAGNOSIS — E03.9 ACQUIRED HYPOTHYROIDISM: ICD-10-CM

## 2025-08-14 RX ORDER — LEVOTHYROXINE SODIUM 100 UG/1
TABLET ORAL
Qty: 90 TABLET | Refills: 1 | Status: SHIPPED | OUTPATIENT
Start: 2025-08-14

## 2025-08-28 DIAGNOSIS — R56.9 SEIZURES (HCC): ICD-10-CM

## 2025-08-29 RX ORDER — PHENYTOIN SODIUM 100 MG/1
CAPSULE, EXTENDED RELEASE ORAL
Qty: 270 CAPSULE | Refills: 3 | Status: SHIPPED | OUTPATIENT
Start: 2025-08-29

## (undated) DEVICE — MASK,FACE,MAXFLUIDPROTECT,SHIELD/ERLPS: Brand: MEDLINE

## (undated) DEVICE — Device: Brand: DEFENDO VALVE AND CONNECTOR KIT

## (undated) DEVICE — 6 X 9  1.75MIL 4-WALL LABGUARD: Brand: MINIGRIP COMMERCIAL LLC

## (undated) DEVICE — CONTAINER SPEC COLL 960ML POLYPR TRIANG GRAD INTAKE/OUTPUT

## (undated) DEVICE — GOWN ISOLATN REG YEL M WT MULTIPLY SIDETIE LEV 2

## (undated) DEVICE — TUBING, SUCTION, 1/4" X 10', STRAIGHT: Brand: MEDLINE

## (undated) DEVICE — SPONGE GZ 4IN 4IN 4 PLY N WVN AVANT

## (undated) DEVICE — KENDALL 450 SERIES MONITORING FOAM ELECTRODE - RECTANGULAR SHAPE ( 3/PK): Brand: KENDALL

## (undated) DEVICE — FORCEPS BX L240CM JAW DIA2.8MM L CAP W/ NDL MIC MESH TOOTH

## (undated) DEVICE — LUBRICANT SURG JELLY ST BACTER TUBE 4.25OZ

## (undated) DEVICE — KIT BEDSIDE REVITAL OX 500ML